# Patient Record
Sex: FEMALE | Race: ASIAN | Employment: FULL TIME | URBAN - METROPOLITAN AREA
[De-identification: names, ages, dates, MRNs, and addresses within clinical notes are randomized per-mention and may not be internally consistent; named-entity substitution may affect disease eponyms.]

---

## 2017-01-19 ENCOUNTER — OFFICE VISIT (OUTPATIENT)
Dept: NEPHROLOGY | Facility: CLINIC | Age: 57
End: 2017-01-19

## 2017-01-19 VITALS — WEIGHT: 155 LBS | BODY MASS INDEX: 30 KG/M2 | DIASTOLIC BLOOD PRESSURE: 88 MMHG | SYSTOLIC BLOOD PRESSURE: 164 MMHG

## 2017-01-19 DIAGNOSIS — I10 ESSENTIAL HYPERTENSION: ICD-10-CM

## 2017-01-19 DIAGNOSIS — N02.8 IGA NEPHROPATHY: ICD-10-CM

## 2017-01-19 DIAGNOSIS — N18.31 CKD STAGE G3A/A1, GFR 45-59 AND ALBUMIN CREATININE RATIO <30 MG/G (HCC): Primary | ICD-10-CM

## 2017-01-19 DIAGNOSIS — N18.4 CKD (CHRONIC KIDNEY DISEASE), STAGE 4 (SEVERE): ICD-10-CM

## 2017-01-19 NOTE — PROGRESS NOTES
Nephrology Progress Note      ASSESSMENT/PLAN:        1) CKD 4- due to biopsy-proven IgA nephropathy with progressive renal dysfunction- Cr has increased from 1.3-2.9 mg/dL since 2013.  Unfortunately, there is no direct therapy (ie steroids / Harini Prows • Hypertension    • Obesity    • IgA nephropathy      nephrotic range proteinuria   • Vitamin D deficiency    • GERD (gastroesophageal reflux disease)    • Fatty liver 1/18/2013          Past Surgical History    TOTAL ABDOM HYSTERECTOMY      COLONOSCOPY, rashes/myalgias/arthralgias      PHYSICAL EXAM:   /88 mmHg  Wt 155 lb  Wt Readings from Last 3 Encounters:  01/19/17 : 155 lb  10/07/16 : 148 lb 12.8 oz  07/19/16 : 146 lb    General: Alert and oriented in no apparent distress.   HEENT: No scleral ict

## 2017-03-21 ENCOUNTER — HOSPITAL ENCOUNTER (INPATIENT)
Facility: HOSPITAL | Age: 57
LOS: 1 days | Discharge: HOME | DRG: 683 | End: 2017-03-23
Attending: EMERGENCY MEDICINE | Admitting: INTERNAL MEDICINE
Payer: COMMERCIAL

## 2017-03-21 DIAGNOSIS — D64.9 ANEMIA, UNSPECIFIED TYPE: ICD-10-CM

## 2017-03-21 DIAGNOSIS — E16.2 HYPOGLYCEMIA: ICD-10-CM

## 2017-03-21 DIAGNOSIS — N19 RENAL FAILURE: Primary | ICD-10-CM

## 2017-03-21 LAB
ALBUMIN SERPL-MCNC: 3.1 G/DL (ref 3.5–4.8)
ALP LIVER SERPL-CCNC: 100 U/L (ref 46–118)
ALT SERPL-CCNC: 21 U/L (ref 14–54)
AST SERPL-CCNC: 12 U/L (ref 15–41)
BASOPHILS # BLD AUTO: 0.06 X10(3) UL (ref 0–0.1)
BASOPHILS NFR BLD AUTO: 0.7 %
BILIRUB SERPL-MCNC: <0.1 MG/DL (ref 0.1–2)
BUN BLD-MCNC: 88 MG/DL (ref 8–20)
CALCIUM BLD-MCNC: 7.9 MG/DL (ref 8.3–10.3)
CHLORIDE: 116 MMOL/L (ref 101–111)
CO2: 13 MMOL/L (ref 22–32)
CREAT BLD-MCNC: 5.46 MG/DL (ref 0.55–1.02)
EOSINOPHIL # BLD AUTO: 0.21 X10(3) UL (ref 0–0.3)
EOSINOPHIL NFR BLD AUTO: 2.3 %
ERYTHROCYTE [DISTWIDTH] IN BLOOD BY AUTOMATED COUNT: 13.4 % (ref 11.5–16)
GLUCOSE BLD-MCNC: 62 MG/DL (ref 70–99)
GLUCOSE BLD-MCNC: 71 MG/DL (ref 65–99)
HCT VFR BLD AUTO: 24.8 % (ref 34–50)
HGB BLD-MCNC: 8.5 G/DL (ref 12–16)
IMMATURE GRANULOCYTE COUNT: 0.04 X10(3) UL (ref 0–1)
IMMATURE GRANULOCYTE RATIO %: 0.4 %
LIPASE: 938 U/L (ref 73–393)
LYMPHOCYTES # BLD AUTO: 1.43 X10(3) UL (ref 0.9–4)
LYMPHOCYTES NFR BLD AUTO: 15.8 %
M PROTEIN MFR SERPL ELPH: 7 G/DL (ref 6.1–8.3)
MCH RBC QN AUTO: 30.5 PG (ref 27–33.2)
MCHC RBC AUTO-ENTMCNC: 34.3 G/DL (ref 31–37)
MCV RBC AUTO: 88.9 FL (ref 81–100)
MONOCYTES # BLD AUTO: 0.84 X10(3) UL (ref 0.1–0.6)
MONOCYTES NFR BLD AUTO: 9.3 %
NEUTROPHIL ABS PRELIM: 6.47 X10 (3) UL (ref 1.3–6.7)
NEUTROPHILS # BLD AUTO: 6.47 X10(3) UL (ref 1.3–6.7)
NEUTROPHILS NFR BLD AUTO: 71.5 %
PLATELET # BLD AUTO: 286 10(3)UL (ref 150–450)
POTASSIUM SERPL-SCNC: 5 MMOL/L (ref 3.6–5.1)
RBC # BLD AUTO: 2.79 X10(6)UL (ref 3.8–5.1)
RED CELL DISTRIBUTION WIDTH-SD: 43.8 FL (ref 35.1–46.3)
SODIUM SERPL-SCNC: 141 MMOL/L (ref 136–144)
WBC # BLD AUTO: 9.1 X10(3) UL (ref 4–13)

## 2017-03-21 RX ORDER — DEXTROSE AND SODIUM CHLORIDE 5; .45 G/100ML; G/100ML
INJECTION, SOLUTION INTRAVENOUS ONCE
Status: COMPLETED | OUTPATIENT
Start: 2017-03-21 | End: 2017-03-22

## 2017-03-22 ENCOUNTER — APPOINTMENT (OUTPATIENT)
Dept: CT IMAGING | Facility: HOSPITAL | Age: 57
DRG: 683 | End: 2017-03-22
Attending: INTERNAL MEDICINE
Payer: COMMERCIAL

## 2017-03-22 PROBLEM — E16.2 HYPOGLYCEMIA: Status: ACTIVE | Noted: 2017-03-22

## 2017-03-22 PROBLEM — D64.9 ANEMIA, UNSPECIFIED TYPE: Status: ACTIVE | Noted: 2017-03-22

## 2017-03-22 LAB
BASOPHILS # BLD AUTO: 0.04 X10(3) UL (ref 0–0.1)
BASOPHILS NFR BLD AUTO: 0.6 %
BILIRUB UR QL STRIP.AUTO: NEGATIVE
BUN BLD-MCNC: 82 MG/DL (ref 8–20)
CALCIUM BLD-MCNC: 7.8 MG/DL (ref 8.3–10.3)
CHLORIDE: 119 MMOL/L (ref 101–111)
CLARITY UR REFRACT.AUTO: CLEAR
CO2: 13 MMOL/L (ref 22–32)
COLOR UR AUTO: COLORLESS
CREAT BLD-MCNC: 5.24 MG/DL (ref 0.55–1.02)
DEPRECATED HBV CORE AB SER IA-ACNC: 66.7 NG/ML (ref 10–291)
EOSINOPHIL # BLD AUTO: 0.21 X10(3) UL (ref 0–0.3)
EOSINOPHIL NFR BLD AUTO: 2.9 %
ERYTHROCYTE [DISTWIDTH] IN BLOOD BY AUTOMATED COUNT: 13.3 % (ref 11.5–16)
EST. AVERAGE GLUCOSE BLD GHB EST-MCNC: 114 MG/DL (ref 68–126)
GLUCOSE BLD-MCNC: 100 MG/DL (ref 65–99)
GLUCOSE BLD-MCNC: 100 MG/DL (ref 65–99)
GLUCOSE BLD-MCNC: 117 MG/DL (ref 70–99)
GLUCOSE BLD-MCNC: 131 MG/DL (ref 65–99)
GLUCOSE BLD-MCNC: 154 MG/DL (ref 65–99)
GLUCOSE BLD-MCNC: 156 MG/DL (ref 65–99)
GLUCOSE BLD-MCNC: 171 MG/DL (ref 65–99)
GLUCOSE BLD-MCNC: 196 MG/DL (ref 65–99)
GLUCOSE BLD-MCNC: 44 MG/DL (ref 65–99)
GLUCOSE BLD-MCNC: 63 MG/DL (ref 65–99)
GLUCOSE BLD-MCNC: 68 MG/DL (ref 65–99)
GLUCOSE BLD-MCNC: 82 MG/DL (ref 65–99)
GLUCOSE UR STRIP.AUTO-MCNC: NEGATIVE MG/DL
HBA1C MFR BLD HPLC: 5.6 % (ref ?–5.7)
HCT VFR BLD AUTO: 22.9 % (ref 34–50)
HGB BLD-MCNC: 7.6 G/DL (ref 12–16)
IMMATURE GRANULOCYTE COUNT: 0.04 X10(3) UL (ref 0–1)
IMMATURE GRANULOCYTE RATIO %: 0.6 %
IRON SATURATION: 40 % (ref 13–45)
IRON: 98 UG/DL (ref 28–170)
KETONES UR STRIP.AUTO-MCNC: NEGATIVE MG/DL
LEUKOCYTE ESTERASE UR QL STRIP.AUTO: NEGATIVE
LYMPHOCYTES # BLD AUTO: 1.47 X10(3) UL (ref 0.9–4)
LYMPHOCYTES NFR BLD AUTO: 20.2 %
MCH RBC QN AUTO: 29.5 PG (ref 27–33.2)
MCHC RBC AUTO-ENTMCNC: 33.2 G/DL (ref 31–37)
MCV RBC AUTO: 88.8 FL (ref 81–100)
MONOCYTES # BLD AUTO: 0.62 X10(3) UL (ref 0.1–0.6)
MONOCYTES NFR BLD AUTO: 8.5 %
NEUTROPHIL ABS PRELIM: 4.88 X10 (3) UL (ref 1.3–6.7)
NEUTROPHILS # BLD AUTO: 4.88 X10(3) UL (ref 1.3–6.7)
NEUTROPHILS NFR BLD AUTO: 67.2 %
NITRITE UR QL STRIP.AUTO: NEGATIVE
PH UR STRIP.AUTO: 5 [PH] (ref 4.5–8)
PLATELET # BLD AUTO: 255 10(3)UL (ref 150–450)
POTASSIUM SERPL-SCNC: 4.9 MMOL/L (ref 3.6–5.1)
PROT UR STRIP.AUTO-MCNC: 100 MG/DL
PTH-INTACT SERPL-MCNC: 503.9 PG/ML (ref 11.1–79.5)
RBC # BLD AUTO: 2.58 X10(6)UL (ref 3.8–5.1)
RBC UR QL AUTO: NEGATIVE
RED CELL DISTRIBUTION WIDTH-SD: 43.8 FL (ref 35.1–46.3)
SODIUM SERPL-SCNC: 144 MMOL/L (ref 136–144)
SP GR UR STRIP.AUTO: 1.01 (ref 1–1.03)
TOTAL IRON BINDING CAPACITY: 247 UG/DL (ref 298–536)
TRANSFERRIN: 166 MG/DL (ref 200–360)
UROBILINOGEN UR STRIP.AUTO-MCNC: <2 MG/DL
WBC # BLD AUTO: 7.3 X10(3) UL (ref 4–13)

## 2017-03-22 PROCEDURE — 99254 IP/OBS CNSLTJ NEW/EST MOD 60: CPT | Performed by: INTERNAL MEDICINE

## 2017-03-22 PROCEDURE — 74176 CT ABD & PELVIS W/O CONTRAST: CPT

## 2017-03-22 RX ORDER — DEXTROSE MONOHYDRATE 25 G/50ML
50 INJECTION, SOLUTION INTRAVENOUS
Status: DISCONTINUED | OUTPATIENT
Start: 2017-03-22 | End: 2017-03-23

## 2017-03-22 RX ORDER — SODIUM BICARBONATE 325 MG/1
650 TABLET ORAL 2 TIMES DAILY
Status: DISCONTINUED | OUTPATIENT
Start: 2017-03-22 | End: 2017-03-23

## 2017-03-22 RX ORDER — DEXTROSE MONOHYDRATE 50 MG/ML
INJECTION, SOLUTION INTRAVENOUS CONTINUOUS
Status: DISCONTINUED | OUTPATIENT
Start: 2017-03-22 | End: 2017-03-22

## 2017-03-22 RX ORDER — ATORVASTATIN CALCIUM 40 MG/1
40 TABLET, FILM COATED ORAL NIGHTLY
Status: DISCONTINUED | OUTPATIENT
Start: 2017-03-22 | End: 2017-03-23

## 2017-03-22 RX ORDER — HYDRALAZINE HYDROCHLORIDE 25 MG/1
25 TABLET, FILM COATED ORAL 2 TIMES DAILY
Status: DISCONTINUED | OUTPATIENT
Start: 2017-03-22 | End: 2017-03-23

## 2017-03-22 RX ORDER — ONDANSETRON 2 MG/ML
4 INJECTION INTRAMUSCULAR; INTRAVENOUS EVERY 6 HOURS PRN
Status: DISCONTINUED | OUTPATIENT
Start: 2017-03-22 | End: 2017-03-23

## 2017-03-22 RX ORDER — LISINOPRIL 20 MG/1
20 TABLET ORAL DAILY
Status: DISCONTINUED | OUTPATIENT
Start: 2017-03-22 | End: 2017-03-22

## 2017-03-22 RX ORDER — COLESEVELAM 180 1/1
1875 TABLET ORAL 2 TIMES DAILY WITH MEALS
Status: DISCONTINUED | OUTPATIENT
Start: 2017-03-22 | End: 2017-03-23

## 2017-03-22 RX ORDER — DILTIAZEM HYDROCHLORIDE 180 MG/1
360 CAPSULE, COATED, EXTENDED RELEASE ORAL
Status: DISCONTINUED | OUTPATIENT
Start: 2017-03-22 | End: 2017-03-22

## 2017-03-22 RX ORDER — HEPARIN SODIUM 5000 [USP'U]/ML
5000 INJECTION, SOLUTION INTRAVENOUS; SUBCUTANEOUS EVERY 8 HOURS
Status: DISCONTINUED | OUTPATIENT
Start: 2017-03-22 | End: 2017-03-23

## 2017-03-22 RX ORDER — DEXTROSE MONOHYDRATE 25 G/50ML
INJECTION, SOLUTION INTRAVENOUS
Status: COMPLETED
Start: 2017-03-22 | End: 2017-03-22

## 2017-03-22 RX ORDER — ZOLPIDEM TARTRATE 5 MG/1
5 TABLET ORAL ONCE
Status: COMPLETED | OUTPATIENT
Start: 2017-03-22 | End: 2017-03-22

## 2017-03-22 RX ORDER — SODIUM CHLORIDE 9 MG/ML
INJECTION, SOLUTION INTRAVENOUS CONTINUOUS
Status: DISCONTINUED | OUTPATIENT
Start: 2017-03-22 | End: 2017-03-22

## 2017-03-22 RX ORDER — AMLODIPINE BESYLATE 5 MG/1
5 TABLET ORAL DAILY
Status: DISCONTINUED | OUTPATIENT
Start: 2017-03-22 | End: 2017-03-23

## 2017-03-22 RX ORDER — KETOTIFEN FUMARATE 0.35 MG/ML
1 SOLUTION/ DROPS OPHTHALMIC 2 TIMES DAILY
Status: DISCONTINUED | OUTPATIENT
Start: 2017-03-22 | End: 2017-03-23

## 2017-03-22 RX ORDER — ACETAMINOPHEN 325 MG/1
650 TABLET ORAL EVERY 6 HOURS PRN
Status: DISCONTINUED | OUTPATIENT
Start: 2017-03-22 | End: 2017-03-23

## 2017-03-22 NOTE — ED PROVIDER NOTES
Patient Seen in: [facility] Emergency Department    History   Patient presents with:  Hypoglycemia (metabolic)    Stated Complaint: Hypoglycemia    HPI  Patient is a 80-year-old female who since this morning his felt lightheaded and weak.   Patient's Oral Cap,  TAKE 1 CAPSULE ONCE A WEEK   Omega-3-acid Ethyl Esters 1 g Oral Cap,  Take 3 capsules by mouth  daily   Olopatadine HCl 0.2 % Ophthalmic Solution,  Apply 1 drop to eye daily.    Glucose Blood (ONETOUCH ULTRA BLUE) In Vitro Strip,  USE TO TEST BLO Notable for the following:     Lipase 938 (*)     All other components within normal limits   COMP METABOLIC PANEL (14) - Abnormal; Notable for the following:     Glucose 62 (*)     BUN 88 (*)     Creatinine 5.46 (*)     GFR 8 (*)     Calcium, Total 7.9 Adan Davisbons Medication List        Present on Admission  Date Reviewed: 2/13/2017          ICD-10-CM Noted POA    Renal failure N19 3/21/2017 Unknown

## 2017-03-22 NOTE — ED INITIAL ASSESSMENT (HPI)
Pt c/o hypoglycemia states that blood sugar has been in the 40s for the past 12 hours. Pt states she has had a lot of juice and food without increase in blood sugar. Accuchek on arrival is 70. Pt c/o lightheadedness.

## 2017-03-22 NOTE — CONSULTS
[facility]  Report of Consultation    Ariadna Tolentino Patient Status:  Inpatient    3/13/1960 MRN [id number]   [facility] 4NW-A Attending Lesley Madrid, [address] Day # 1 PCP Kamille Abernathy MD       Assessment / Plan:    1) CKD 4- baseli Clary Melgar at [address] [facility]     Family History   Problem Relation Age of Onset   • Hypertension Mother    • Diabetes Mother    • Cancer Father    • Diabetes Brother    • Diabetes Other       reports that she has never smoked.  She has never u (36.4 °C)       Intake/Output Summary (Last 24 hours) at 03/22/17 0933  Last data filed at 03/22/17 0525   Gross per 24 hour   Intake    695 ml   Output      0 ml   Net    695 ml     Wt Readings from Last 3 Encounters:  03/21/17 : 147 lb  02/13/17 : 151 lb

## 2017-03-22 NOTE — PAYOR COMM NOTE
Attending Physician: Kendall Araiza DO    Review Type: ADMISSION   Reviewer: Evelyn Albarran       Date: March 22, 2017 - 10:03 AM  Payor: KERRI SALGADO  Authorization Number: N/A  Admit date: 3/21/2017 10:21 PM   Admitted from Emergency Dept.: yes    Zuly Ramirez 3/22/2017 8157 Given 360 mg Oral Harriet Viet RN      Heparin Sodium (Porcine) 5000 UNIT/ML injection 5,000 Units     Date Action Dose Route User    3/22/2017 0519 Given 5000 Units Subcutaneous (Left Upper Abdomen) Karri Lowe RN      hydrALAzin for the following:     POC Glucose 44 (*)     All other components within normal limits   POCT GLUCOSE - Abnormal; Notable for the following:     POC Glucose 171 (*)     All other components within normal limits   POCT GLUCOSE - Abnormal; Notable for the f individual orders. FERRITIN   IRON AND TIBC   PTH, INTACT   RAINBOW DRAW BLUE   RAINBOW DRAW GOLD   RAINBOW DRAW LAVENDER   RAINBOW DRAW LIGHT GREEN   OCCULT BLOOD, STOOL         .     Diagnosis:  CKD 4- baseline Cr approx 3 mg/dl due to biopsy proven IgA

## 2017-03-22 NOTE — PROGRESS NOTES
NURSING ADMISSION NOTE      Patient admitted via Cart from ER. Oriented to room. Safety precautions initiated. Bed in low position. Call light in reach. Blood sugar 82 on arrival to the floor. Hs snacks given. Patient is alert and  Oriented.  Up t

## 2017-03-22 NOTE — PLAN OF CARE
Diabetes/Glucose Control    • Glucose maintained within prescribed range Progressing          0200 - Assumed care. Pt resting comfortably. Call light in reach. Bed in lowest position. Daughter at the bedside.     9510 - Pt diaphoretic, states \"I don't feel

## 2017-03-22 NOTE — H&P
[facility] Hospitalist History and Physical      Patient presents with:  Hypoglycemia (metabolic)       PCP: Bala White MD      History of Present Illness: Patient is a 62year old female with PMH sig for DM2, HL, HTN, CKD, GERD, and IGA nephropathy who present urgency. MUSCULOSKELETAL:  No arthritis  SKIN:  No change in skin, hair or nails. NEUROLOGIC:  No paresthesias, weakness, or numbness. PSYCHIATRIC:  No disorder of thought or mood. ENDOCRINE:  No heat or cold intolerance, polyuria or polydipsia.   HEMAT Unenhanced multislice CT scanning was performed from the dome of the diaphragm to the pubic symphysis. Dose reduction techniques were used.  Dose information is transmitted to the [facility] ([email or web address] of Radiology) [place]. [place] nephropathy who presents with feeling weak and dizzy    #BEN on CKD 4   -due to biopsy proven igA nephropathy  -unclear etiology but has had decreased po intake per niece  -holding ace, cont IVF  -renal on consult  -undergoing transplant eval    #Metabolic

## 2017-03-23 VITALS
BODY MASS INDEX: 27.75 KG/M2 | HEIGHT: 61 IN | TEMPERATURE: 98 F | SYSTOLIC BLOOD PRESSURE: 154 MMHG | RESPIRATION RATE: 20 BRPM | DIASTOLIC BLOOD PRESSURE: 67 MMHG | WEIGHT: 147 LBS | HEART RATE: 65 BPM | OXYGEN SATURATION: 97 %

## 2017-03-23 LAB
BASOPHILS # BLD AUTO: 0.06 X10(3) UL (ref 0–0.1)
BASOPHILS NFR BLD AUTO: 0.7 %
BUN BLD-MCNC: 76 MG/DL (ref 8–20)
CALCIUM BLD-MCNC: 7.9 MG/DL (ref 8.3–10.3)
CHLORIDE: 112 MMOL/L (ref 101–111)
CO2: 24 MMOL/L (ref 22–32)
CREAT BLD-MCNC: 4.78 MG/DL (ref 0.55–1.02)
EOSINOPHIL # BLD AUTO: 0.38 X10(3) UL (ref 0–0.3)
EOSINOPHIL NFR BLD AUTO: 4.4 %
ERYTHROCYTE [DISTWIDTH] IN BLOOD BY AUTOMATED COUNT: 13.1 % (ref 11.5–16)
GLUCOSE BLD-MCNC: 122 MG/DL (ref 70–99)
GLUCOSE BLD-MCNC: 128 MG/DL (ref 65–99)
GLUCOSE BLD-MCNC: 160 MG/DL (ref 65–99)
HAV IGM SER QL: 1.7 MG/DL (ref 1.7–3)
HCT VFR BLD AUTO: 22.2 % (ref 34–50)
HGB BLD-MCNC: 7.7 G/DL (ref 12–16)
IMMATURE GRANULOCYTE COUNT: 0.02 X10(3) UL (ref 0–1)
IMMATURE GRANULOCYTE RATIO %: 0.2 %
LYMPHOCYTES # BLD AUTO: 2.84 X10(3) UL (ref 0.9–4)
LYMPHOCYTES NFR BLD AUTO: 32.9 %
MCH RBC QN AUTO: 30.4 PG (ref 27–33.2)
MCHC RBC AUTO-ENTMCNC: 34.7 G/DL (ref 31–37)
MCV RBC AUTO: 87.7 FL (ref 81–100)
MONOCYTES # BLD AUTO: 1 X10(3) UL (ref 0.1–0.6)
MONOCYTES NFR BLD AUTO: 11.6 %
NEUTROPHIL ABS PRELIM: 4.34 X10 (3) UL (ref 1.3–6.7)
NEUTROPHILS # BLD AUTO: 4.34 X10(3) UL (ref 1.3–6.7)
NEUTROPHILS NFR BLD AUTO: 50.2 %
PHOSPHATE SERPL-MCNC: 3.9 MG/DL (ref 2.5–4.9)
PLATELET # BLD AUTO: 265 10(3)UL (ref 150–450)
POTASSIUM SERPL-SCNC: 4.5 MMOL/L (ref 3.6–5.1)
RBC # BLD AUTO: 2.53 X10(6)UL (ref 3.8–5.1)
RED CELL DISTRIBUTION WIDTH-SD: 41.2 FL (ref 35.1–46.3)
SODIUM SERPL-SCNC: 145 MMOL/L (ref 136–144)
WBC # BLD AUTO: 8.6 X10(3) UL (ref 4–13)

## 2017-03-23 RX ORDER — AMLODIPINE BESYLATE 5 MG/1
5 TABLET ORAL DAILY
Qty: 30 TABLET | Refills: 0 | Status: SHIPPED | OUTPATIENT
Start: 2017-03-23 | End: 2017-04-27

## 2017-03-23 RX ORDER — SODIUM BICARBONATE 650 MG/1
650 TABLET ORAL 2 TIMES DAILY
Qty: 60 TABLET | Refills: 0 | Status: SHIPPED | OUTPATIENT
Start: 2017-03-23 | End: 2017-06-22

## 2017-03-23 NOTE — PLAN OF CARE
Diabetes/Glucose Control    • Glucose maintained within prescribed range Progressing        METABOLIC/FLUID AND ELECTROLYTES - ADULT    • Electrolytes maintained within normal limits Progressing    • Hemodynamic stability and optimal renal function yolisi

## 2017-03-23 NOTE — PROGRESS NOTES
[facility]  Nephrology Progress Note    [address] [place] Attending:  Elie Soliz,        Assessment and Plan:    1) BEN- due to volume depletion + ACE-i effect- improving with IVF; expect Cr to plateau < 4 mg/dl.  Undergoing transplant eval    2) CKD 4 03/23/2017    03/23/2017   CA 7.9 03/23/2017   MG 1.7 03/23/2017   PHOS 3.9 03/23/2017   PGLU 128 03/23/2017       Imaging: All imaging studies reviewed.     Meds:     Current Facility-Administered Medications:  Heparin Sodium (Porcine) 5000 UNIT/ML

## 2017-03-23 NOTE — DISCHARGE SUMMARY
General Medicine Discharge Summary     Patient ID:  Beecher Severs  62year old  3/13/1960    Admit date: 3/21/2017    Discharge date and time: 3/23/17    Attending Physician: DO Dick Putnam lisinopril and cardizem. Cont hydralazine.   Amlodipine started    #Adrenal incidentaloma  -noted on CT scan - f/u with PCP to w/u    #Anemia  -likely of chronic disease  -iron studies not c/w iron deficiency    I discussed this with patient's niece who is thickening, lesion, or calculus. PELVIC NODES:  Normal.  No adenopathy. PELVIC ORGANS:  Status post hysterectomy. BONES:  Mild degenerative changes of the lower thoracic spine. LUNG BASES:  Probable subpleural atelectasis in the left lower lobe.         3

## 2017-03-23 NOTE — PROGRESS NOTES
NURSING DISCHARGE NOTE    Discharged Home via Ambulatory. Accompanied by Spouse  Belongings Taken by patient/family. Pt dc'd aaox4, denies pain, epogen given, dc instructions given to pt, verbalized understanding.

## 2017-03-23 NOTE — PLAN OF CARE
Diabetes/Glucose Control    • Glucose maintained within prescribed range Progressing        Pt's blood sugars in the 100s to 190s today. No further episodes of hypogolycemia. AIC noted to be 5.6.      HEMATOLOGIC - ADULT    • Maintains hematologic stability

## 2017-03-30 ENCOUNTER — APPOINTMENT (OUTPATIENT)
Dept: LAB | Age: 57
End: 2017-03-30
Attending: HOSPITALIST
Payer: COMMERCIAL

## 2017-03-30 DIAGNOSIS — N19 RENAL FAILURE: ICD-10-CM

## 2017-03-30 LAB
BUN BLD-MCNC: 98 MG/DL (ref 8–20)
CALCIUM BLD-MCNC: 9 MG/DL (ref 8.3–10.3)
CHLORIDE: 107 MMOL/L (ref 101–111)
CO2: 19 MMOL/L (ref 22–32)
CREAT BLD-MCNC: 4.89 MG/DL (ref 0.55–1.02)
GLUCOSE BLD-MCNC: 101 MG/DL (ref 70–99)
POTASSIUM SERPL-SCNC: 4.5 MMOL/L (ref 3.6–5.1)
SODIUM SERPL-SCNC: 137 MMOL/L (ref 136–144)

## 2017-03-30 PROCEDURE — 80048 BASIC METABOLIC PNL TOTAL CA: CPT

## 2017-04-17 ENCOUNTER — LAB ENCOUNTER (OUTPATIENT)
Dept: LAB | Age: 57
End: 2017-04-17
Attending: INTERNAL MEDICINE
Payer: COMMERCIAL

## 2017-04-17 ENCOUNTER — OFFICE VISIT (OUTPATIENT)
Dept: NEPHROLOGY | Facility: CLINIC | Age: 57
End: 2017-04-17

## 2017-04-17 VITALS
SYSTOLIC BLOOD PRESSURE: 156 MMHG | DIASTOLIC BLOOD PRESSURE: 86 MMHG | RESPIRATION RATE: 18 BRPM | WEIGHT: 144 LBS | BODY MASS INDEX: 27 KG/M2 | HEART RATE: 74 BPM

## 2017-04-17 DIAGNOSIS — N18.4 CKD (CHRONIC KIDNEY DISEASE), STAGE 4 (SEVERE): Primary | ICD-10-CM

## 2017-04-17 DIAGNOSIS — N18.4 CKD (CHRONIC KIDNEY DISEASE), STAGE 4 (SEVERE): ICD-10-CM

## 2017-04-17 DIAGNOSIS — N18.9 ANEMIA IN CHRONIC KIDNEY DISEASE(285.21): ICD-10-CM

## 2017-04-17 DIAGNOSIS — D63.1 ANEMIA IN CHRONIC KIDNEY DISEASE(285.21): ICD-10-CM

## 2017-04-17 DIAGNOSIS — E87.2 METABOLIC ACIDOSIS: ICD-10-CM

## 2017-04-17 DIAGNOSIS — N17.9 AKI (ACUTE KIDNEY INJURY) (HCC): ICD-10-CM

## 2017-04-17 PROCEDURE — 80048 BASIC METABOLIC PNL TOTAL CA: CPT | Performed by: INTERNAL MEDICINE

## 2017-04-17 PROCEDURE — 82728 ASSAY OF FERRITIN: CPT | Performed by: INTERNAL MEDICINE

## 2017-04-17 PROCEDURE — 83540 ASSAY OF IRON: CPT | Performed by: INTERNAL MEDICINE

## 2017-04-17 PROCEDURE — 99213 OFFICE O/P EST LOW 20 MIN: CPT | Performed by: INTERNAL MEDICINE

## 2017-04-17 PROCEDURE — 83550 IRON BINDING TEST: CPT | Performed by: INTERNAL MEDICINE

## 2017-04-17 PROCEDURE — 36415 COLL VENOUS BLD VENIPUNCTURE: CPT | Performed by: INTERNAL MEDICINE

## 2017-04-17 PROCEDURE — 85025 COMPLETE CBC W/AUTO DIFF WBC: CPT | Performed by: INTERNAL MEDICINE

## 2017-04-17 NOTE — PROGRESS NOTES
Nephrology Progress Note      ASSESSMENT/PLAN:        1) CKD 4- due to biopsy-proven IgA nephropathy with progressive renal dysfunction- Cr has increased from 1.3-> 3+ mg/dL since 2013.  Unfortunately, there is no direct therapy (ie steroids / immunosuppres 6/1/10    Comment Performed by Sanjeev Michaud at [facility]      Family History   Problem Relation Age of Onset   • Hypertension Mother    • Diabetes Mother    • Cancer Father    • Diabetes Brother    • Diabetes Other       Social History: thyromegaly  Cardiac: Regular rate and rhythm, S1, S2 normal, no murmur or rub  Lungs: Clear without wheezes, rales, rhonchi. Abdomen: Soft, non-tender. + bowel sounds, no palpable organomegaly  Extremities: Without clubbing, cyanosis or edema.   Neurolo

## 2017-05-05 ENCOUNTER — TELEPHONE (OUTPATIENT)
Dept: NEPHROLOGY | Facility: CLINIC | Age: 57
End: 2017-05-05

## 2017-05-05 DIAGNOSIS — N18.4 CKD (CHRONIC KIDNEY DISEASE), STAGE 4 (SEVERE): Primary | ICD-10-CM

## 2017-05-05 DIAGNOSIS — N18.9 ANEMIA IN CHRONIC KIDNEY DISEASE(285.21): ICD-10-CM

## 2017-05-05 DIAGNOSIS — D63.1 ANEMIA IN CHRONIC KIDNEY DISEASE(285.21): ICD-10-CM

## 2017-05-18 ENCOUNTER — TELEPHONE (OUTPATIENT)
Dept: NEPHROLOGY | Facility: CLINIC | Age: 57
End: 2017-05-18

## 2017-05-22 NOTE — TELEPHONE ENCOUNTER
Left VM with pt- labs relatively stable; no indication for dialysis; to continue checking labs monthly and has f/u appt in August- thx nadira

## 2017-06-22 RX ORDER — AMLODIPINE BESYLATE 5 MG/1
5 TABLET ORAL DAILY
Qty: 30 TABLET | Refills: 5 | Status: SHIPPED | OUTPATIENT
Start: 2017-06-22 | End: 2017-10-26 | Stop reason: DRUGHIGH

## 2017-06-22 RX ORDER — SODIUM BICARBONATE 650 MG/1
650 TABLET ORAL 2 TIMES DAILY
Qty: 60 TABLET | Refills: 5 | Status: SHIPPED | OUTPATIENT
Start: 2017-06-22 | End: 2018-02-08 | Stop reason: ALTCHOICE

## 2017-06-22 RX ORDER — HYDRALAZINE HYDROCHLORIDE 25 MG/1
25 TABLET, FILM COATED ORAL 2 TIMES DAILY
Qty: 60 TABLET | Refills: 5 | Status: SHIPPED | OUTPATIENT
Start: 2017-06-22 | End: 2017-10-26

## 2017-07-16 LAB
ABSOLUTE BASOPHILS: 30 CELLS/UL (ref 0–200)
ABSOLUTE EOSINOPHILS: 354 CELLS/UL (ref 15–500)
ABSOLUTE LYMPHOCYTES: 2677 CELLS/UL (ref 850–3900)
ABSOLUTE MONOCYTES: 556 CELLS/UL (ref 200–950)
ABSOLUTE NEUTROPHILS: 6484 CELLS/UL (ref 1500–7800)
BASOPHILS: 0.3 %
BUN/CREATININE RATIO: 13 (CALC) (ref 6–22)
BUN: 110 MG/DL (ref 7–25)
CALCIUM: 8 MG/DL (ref 8.6–10.4)
CARBON DIOXIDE: 15 MMOL/L (ref 20–31)
CHLORIDE: 111 MMOL/L (ref 98–110)
CREATININE: 8.77 MG/DL (ref 0.5–1.05)
EGFR IF AFRICN AM: 5 ML/MIN/1.73M2
EGFR IF NONAFRICN AM: 5 ML/MIN/1.73M2
EOSINOPHILS: 3.5 %
GLUCOSE: 79 MG/DL (ref 65–99)
HEMATOCRIT: 24.5 % (ref 35–45)
HEMOGLOBIN: 7.9 G/DL (ref 11.7–15.5)
LYMPHOCYTES: 26.5 %
MCH: 28.6 PG (ref 27–33)
MCHC: 32.2 G/DL (ref 32–36)
MCV: 88.8 FL (ref 80–100)
MONOCYTES: 5.5 %
MPV: 9.2 FL (ref 7.5–12.5)
NEUTROPHILS: 64.2 %
PLATELET COUNT: 243 THOUSAND/UL (ref 140–400)
POTASSIUM: 4.4 MMOL/L (ref 3.5–5.3)
RDW: 13.6 % (ref 11–15)
RED BLOOD CELL COUNT: 2.76 MILLION/UL (ref 3.8–5.1)
SODIUM: 142 MMOL/L (ref 135–146)
WHITE BLOOD CELL COUNT: 10.1 THOUSAND/UL (ref 3.8–10.8)

## 2017-07-19 ENCOUNTER — TELEPHONE (OUTPATIENT)
Dept: NEPHROLOGY | Facility: CLINIC | Age: 57
End: 2017-07-19

## 2017-07-20 ENCOUNTER — TELEPHONE (OUTPATIENT)
Dept: NEPHROLOGY | Facility: CLINIC | Age: 57
End: 2017-07-20

## 2017-07-20 DIAGNOSIS — Z99.2 ESRD NEEDING DIALYSIS (HCC): Primary | ICD-10-CM

## 2017-07-20 DIAGNOSIS — N18.6 ESRD NEEDING DIALYSIS (HCC): Primary | ICD-10-CM

## 2017-07-21 LAB
ABSOLUTE BASOPHILS: 55 CELLS/UL (ref 0–200)
ABSOLUTE EOSINOPHILS: 286 CELLS/UL (ref 15–500)
ABSOLUTE LYMPHOCYTES: 2794 CELLS/UL (ref 850–3900)
ABSOLUTE MONOCYTES: 506 CELLS/UL (ref 200–950)
ABSOLUTE NEUTROPHILS: 7359 CELLS/UL (ref 1500–7800)
BASOPHILS: 0.5 %
BUN/CREATININE RATIO: 14 (CALC) (ref 6–22)
BUN: 110 MG/DL (ref 7–25)
CALCIUM: 8.3 MG/DL (ref 8.6–10.4)
CARBON DIOXIDE: 17 MMOL/L (ref 20–31)
CHLORIDE: 109 MMOL/L (ref 98–110)
CREATININE: 7.8 MG/DL (ref 0.5–1.05)
EGFR IF AFRICN AM: 6 ML/MIN/1.73M2
EGFR IF NONAFRICN AM: 5 ML/MIN/1.73M2
EOSINOPHILS: 2.6 %
GLUCOSE: 95 MG/DL (ref 65–99)
HEMATOCRIT: 25.5 % (ref 35–45)
HEMOGLOBIN: 8.5 G/DL (ref 11.7–15.5)
LYMPHOCYTES: 25.4 %
MCH: 28.9 PG (ref 27–33)
MCHC: 33.3 G/DL (ref 32–36)
MCV: 86.7 FL (ref 80–100)
MONOCYTES: 4.6 %
MPV: 9.8 FL (ref 7.5–12.5)
NEUTROPHILS: 66.9 %
PLATELET COUNT: 275 THOUSAND/UL (ref 140–400)
POTASSIUM: 4.3 MMOL/L (ref 3.5–5.3)
RDW: 14 % (ref 11–15)
RED BLOOD CELL COUNT: 2.94 MILLION/UL (ref 3.8–5.1)
SODIUM: 139 MMOL/L (ref 135–146)
WHITE BLOOD CELL COUNT: 11 THOUSAND/UL (ref 3.8–10.8)

## 2017-07-22 ENCOUNTER — TELEPHONE (OUTPATIENT)
Dept: NEPHROLOGY | Facility: CLINIC | Age: 57
End: 2017-07-22

## 2017-07-22 NOTE — TELEPHONE ENCOUNTER
Spoke to pt + family- to have permcath placed and start outpt HD today- ongoing eval for transplant- thx nadira

## 2017-08-03 ENCOUNTER — TELEPHONE (OUTPATIENT)
Dept: NEPHROLOGY | Facility: CLINIC | Age: 57
End: 2017-08-03

## 2017-08-04 NOTE — TELEPHONE ENCOUNTER
Pt is cleared to have AV fistula placement from an internal medicine perspective. I have examined her and reviewed her labs / EKG, etc- there is no contraindication for surgery and pt is at low risk.     Jackie Mo MD

## 2017-10-26 ENCOUNTER — OFFICE VISIT (OUTPATIENT)
Dept: NEPHROLOGY | Facility: CLINIC | Age: 57
End: 2017-10-26

## 2017-10-26 VITALS — BODY MASS INDEX: 26 KG/M2 | WEIGHT: 139 LBS | DIASTOLIC BLOOD PRESSURE: 86 MMHG | SYSTOLIC BLOOD PRESSURE: 148 MMHG

## 2017-10-26 DIAGNOSIS — N18.6 ESRD (END STAGE RENAL DISEASE) (HCC): Primary | ICD-10-CM

## 2017-10-26 RX ORDER — COLESEVELAM 180 1/1
2500 TABLET ORAL DAILY
Qty: 360 TABLET | Refills: 1 | Status: SHIPPED | OUTPATIENT
Start: 2017-10-26 | End: 2018-07-26

## 2017-10-26 RX ORDER — PYRIDOXINE HCL (VITAMIN B6) 50 MG
50 TABLET ORAL DAILY
COMMUNITY
End: 2018-02-08 | Stop reason: ALTCHOICE

## 2017-10-26 RX ORDER — ASCORBIC ACID, THIAMINE, RIBOFLAVIN, NIACINAMIDE, PYRIDOXINE, FOLIC ACID, COBALAMIN, BIOTIN, PANTOTHENIC ACID 100; 1.5; 1.7; 20; 10; 1; 6; 300; 1 MG/1; MG/1; MG/1; MG/1; MG/1; MG/1; UG/1; UG/1; MG/1
1 TABLET, COATED ORAL DAILY
COMMUNITY
End: 2018-02-08 | Stop reason: ALTCHOICE

## 2017-10-26 RX ORDER — ISONIAZID 300 MG/1
300 TABLET ORAL DAILY
COMMUNITY
End: 2018-02-08 | Stop reason: ALTCHOICE

## 2017-10-26 RX ORDER — HYDRALAZINE HYDROCHLORIDE 25 MG/1
25 TABLET, FILM COATED ORAL 2 TIMES DAILY
Qty: 180 TABLET | Refills: 1 | Status: SHIPPED | OUTPATIENT
Start: 2017-10-26 | End: 2018-07-26

## 2017-10-26 RX ORDER — ATORVASTATIN CALCIUM 40 MG/1
TABLET, FILM COATED ORAL
Qty: 90 TABLET | Refills: 1 | Status: SHIPPED | OUTPATIENT
Start: 2017-10-26 | End: 2018-04-11

## 2017-10-26 RX ORDER — AMLODIPINE BESYLATE 5 MG/1
5 TABLET ORAL 2 TIMES DAILY
COMMUNITY
End: 2017-10-26

## 2017-10-26 RX ORDER — AMLODIPINE BESYLATE 5 MG/1
5 TABLET ORAL 2 TIMES DAILY
Qty: 180 TABLET | Refills: 1 | Status: SHIPPED | OUTPATIENT
Start: 2017-10-26 | End: 2018-04-05

## 2017-10-26 NOTE — PROGRESS NOTES
Nephrology Progress Note      ASSESSMENT/PLAN:        1) ESRD- doing well on hemodialysis and meeting targets for dialysis adequacy, access, nutrition phosphorus and anemia management.   AV fistula is functioning well and hemodialysis catheter should be rem Smokeless tobacco: Never Used                      Alcohol use: No                 Medications (Active prior to today's visit):    Current Outpatient Prescriptions: AmLODIPine Besylate 5 MG Oral Tab Take 5 mg by mouth 2 (two) times daily.  Dis normal, no murmur or rub  Lungs: Clear without wheezes, rales, rhonchi. Abdomen: Soft, non-tender. + bowel sounds, no palpable organomegaly  Extremities: Without clubbing, cyanosis or edema.   Neurologic: Alert and oriented, normal affect, cranial nerves

## 2018-02-08 ENCOUNTER — OFFICE VISIT (OUTPATIENT)
Dept: NEPHROLOGY | Facility: CLINIC | Age: 58
End: 2018-02-08

## 2018-02-08 VITALS — SYSTOLIC BLOOD PRESSURE: 162 MMHG | DIASTOLIC BLOOD PRESSURE: 88 MMHG | BODY MASS INDEX: 26 KG/M2 | WEIGHT: 140 LBS

## 2018-02-08 DIAGNOSIS — I10 ESSENTIAL HYPERTENSION: ICD-10-CM

## 2018-02-08 DIAGNOSIS — R00.2 PALPITATIONS: ICD-10-CM

## 2018-02-08 DIAGNOSIS — N18.6 ESRD (END STAGE RENAL DISEASE) (HCC): Primary | ICD-10-CM

## 2018-02-08 DIAGNOSIS — R25.2 CRAMPS, EXTREMITY: ICD-10-CM

## 2018-02-08 PROCEDURE — 99214 OFFICE O/P EST MOD 30 MIN: CPT | Performed by: INTERNAL MEDICINE

## 2018-02-08 RX ORDER — CINACALCET 30 MG/1
30 TABLET, FILM COATED ORAL
COMMUNITY
End: 2018-07-25 | Stop reason: ALTCHOICE

## 2018-02-08 NOTE — PROGRESS NOTES
Nephrology Progress Note      ASSESSMENT/PLAN:        1) ESRD- doing well on hemodialysis and meeting targets for dialysis adequacy, access, nutrition phosphorus and anemia management.   AV fistula is functioning well and hemodialysis catheter should be rem Father    • Diabetes Brother    • Diabetes Other       Social History: Smoking status: Never Smoker                                                              Smokeless tobacco: Never Used                      Alcohol use:  No                 Medications alan Perez MD  2/8/2018  347 PM

## 2018-04-05 RX ORDER — AMLODIPINE BESYLATE 5 MG/1
5 TABLET ORAL 2 TIMES DAILY
Qty: 180 TABLET | Refills: 0 | Status: SHIPPED | OUTPATIENT
Start: 2018-04-05 | End: 2018-07-02

## 2018-06-25 ENCOUNTER — TELEPHONE (OUTPATIENT)
Dept: NEPHROLOGY | Facility: CLINIC | Age: 58
End: 2018-06-25

## 2018-06-25 RX ORDER — AMLODIPINE BESYLATE 10 MG/1
10 TABLET ORAL DAILY
Qty: 90 TABLET | Refills: 3 | Status: SHIPPED | OUTPATIENT
Start: 2018-06-25 | End: 2018-07-25 | Stop reason: ALTCHOICE

## 2018-07-02 RX ORDER — AMLODIPINE BESYLATE 5 MG/1
5 TABLET ORAL 2 TIMES DAILY
Qty: 180 TABLET | Refills: 1 | Status: SHIPPED | OUTPATIENT
Start: 2018-07-02 | End: 2018-07-26

## 2018-07-02 RX ORDER — AMLODIPINE BESYLATE 5 MG/1
5 TABLET ORAL 2 TIMES DAILY
Qty: 180 TABLET | Refills: 0 | OUTPATIENT
Start: 2018-07-02

## 2018-07-25 PROBLEM — R42 DIZZINESS: Status: ACTIVE | Noted: 2018-07-25

## 2018-09-20 ENCOUNTER — TELEPHONE (OUTPATIENT)
Dept: NEPHROLOGY | Facility: CLINIC | Age: 58
End: 2018-09-20

## 2018-09-21 RX ORDER — LOSARTAN POTASSIUM 25 MG/1
50 TABLET ORAL DAILY
Qty: 30 TABLET | Refills: 11 | Status: SHIPPED | OUTPATIENT
Start: 2018-09-21 | End: 2018-10-21

## 2018-09-24 ENCOUNTER — TELEPHONE (OUTPATIENT)
Dept: NEPHROLOGY | Facility: CLINIC | Age: 58
End: 2018-09-24

## 2018-09-24 NOTE — TELEPHONE ENCOUNTER
Pt called. She said you Rx'd new BP medicine. Should she stop the old BP med?   Or take both? [phone]

## 2018-10-30 PROBLEM — Z94.0 S/P KIDNEY TRANSPLANT: Status: ACTIVE | Noted: 2018-10-30

## 2018-10-30 PROBLEM — Z94.0 S/P KIDNEY TRANSPLANT (HCC): Status: ACTIVE | Noted: 2018-10-30

## 2018-10-30 PROBLEM — R42 DIZZINESS: Status: RESOLVED | Noted: 2018-07-25 | Resolved: 2018-10-30

## 2019-02-13 RX ORDER — AMLODIPINE BESYLATE 5 MG/1
TABLET ORAL
Qty: 180 TABLET | Refills: 1 | OUTPATIENT
Start: 2019-02-13

## 2019-04-01 RX ORDER — AMLODIPINE BESYLATE 5 MG/1
TABLET ORAL
Qty: 180 TABLET | Refills: 1 | OUTPATIENT
Start: 2019-04-01

## 2019-07-18 ENCOUNTER — OFFICE VISIT (OUTPATIENT)
Dept: NEPHROLOGY | Facility: CLINIC | Age: 59
End: 2019-07-18
Payer: COMMERCIAL

## 2019-07-18 VITALS — SYSTOLIC BLOOD PRESSURE: 140 MMHG | DIASTOLIC BLOOD PRESSURE: 78 MMHG | BODY MASS INDEX: 31 KG/M2 | WEIGHT: 160 LBS

## 2019-07-18 DIAGNOSIS — Z79.4 TYPE 2 DIABETES MELLITUS WITHOUT COMPLICATION, WITH LONG-TERM CURRENT USE OF INSULIN (HCC): ICD-10-CM

## 2019-07-18 DIAGNOSIS — E11.9 TYPE 2 DIABETES MELLITUS WITHOUT COMPLICATION, WITH LONG-TERM CURRENT USE OF INSULIN (HCC): ICD-10-CM

## 2019-07-18 DIAGNOSIS — I10 ESSENTIAL HYPERTENSION: ICD-10-CM

## 2019-07-18 DIAGNOSIS — N18.30 CKD (CHRONIC KIDNEY DISEASE) STAGE 3, GFR 30-59 ML/MIN (HCC): Primary | ICD-10-CM

## 2019-07-18 DIAGNOSIS — Z94.0 KIDNEY TRANSPLANT RECIPIENT: ICD-10-CM

## 2019-07-18 PROCEDURE — 99214 OFFICE O/P EST MOD 30 MIN: CPT | Performed by: INTERNAL MEDICINE

## 2019-07-18 RX ORDER — GLIPIZIDE 5 MG/1
5 TABLET ORAL
COMMUNITY
End: 2019-12-24

## 2019-07-18 RX ORDER — AMLODIPINE BESYLATE 5 MG/1
5 TABLET ORAL DAILY
COMMUNITY
End: 2020-12-16

## 2019-07-18 RX ORDER — GLIPIZIDE 5 MG/1
5 TABLET ORAL
Qty: 180 TABLET | Refills: 3 | Status: SHIPPED | OUTPATIENT
Start: 2019-07-18 | End: 2019-12-24

## 2019-07-18 RX ORDER — ATOVAQUONE 750 MG/5ML
750 SUSPENSION ORAL DAILY
COMMUNITY
End: 2020-01-23 | Stop reason: ALTCHOICE

## 2019-07-18 NOTE — PROGRESS NOTES
Nephrology Progress Note      ASSESSMENT/PLAN:        1) s/p cad renal transplant 10/18 at [facility] for IgA nephropathy (male, 46s)- doing well with a baseline creatinine of 1.3 to 1.4 mg/dL; no other post transplant complications such as acute rejection, BK vir Procedure Laterality Date   • ESOPHAGOGASTRODUODENOSCOPY, COLONOSCOPY, POSSIBLE BIOPSY, POSSIBLE POLYPECTOMY 49245, 40578 N/A 6/1/2010    Performed by Kelly Drummond MD at [facility]   • TOTAL ABDOM HYSTERECTOMY        Family History   Pro SOB/cough/hemoptysis  Denies abd or flank pain  Denies N/V/D  Denies change in urinary habits or gross hematuria  Denies LE edema  Denies skin rashes/myalgias/arthralgias      PHYSICAL EXAM:   /78   Wt 160 lb   BMI 31.00 kg/m²   Wt Readings from Last

## 2019-10-24 ENCOUNTER — PATIENT MESSAGE (OUTPATIENT)
Dept: NEPHROLOGY | Facility: CLINIC | Age: 59
End: 2019-10-24

## 2019-10-26 RX ORDER — GLIPIZIDE 10 MG/1
10 TABLET ORAL
Qty: 180 TABLET | Refills: 11 | Status: SHIPPED | OUTPATIENT
Start: 2019-10-26 | End: 2020-12-16

## 2019-10-26 NOTE — TELEPHONE ENCOUNTER
From: April Can  To: Marisel Henry MD  Sent: 10/24/2019 10:13 AM CDT  Subject: Non-Urgent Medical Question    Dear Dr. Guille Red,    In the past weeks , my blood sugar had been very high.     Here are my numbers in this week:    Mon : 136 (morning)   237 (ev

## 2020-01-23 ENCOUNTER — OFFICE VISIT (OUTPATIENT)
Dept: NEPHROLOGY | Facility: CLINIC | Age: 60
End: 2020-01-23
Payer: COMMERCIAL

## 2020-01-23 VITALS — WEIGHT: 165 LBS | SYSTOLIC BLOOD PRESSURE: 116 MMHG | DIASTOLIC BLOOD PRESSURE: 68 MMHG | BODY MASS INDEX: 32 KG/M2

## 2020-01-23 DIAGNOSIS — Z94.0 KIDNEY TRANSPLANT RECIPIENT: ICD-10-CM

## 2020-01-23 DIAGNOSIS — I10 ESSENTIAL HYPERTENSION: ICD-10-CM

## 2020-01-23 DIAGNOSIS — N18.30 CKD (CHRONIC KIDNEY DISEASE) STAGE 3, GFR 30-59 ML/MIN (HCC): Primary | ICD-10-CM

## 2020-01-23 PROCEDURE — 99214 OFFICE O/P EST MOD 30 MIN: CPT | Performed by: INTERNAL MEDICINE

## 2020-01-23 RX ORDER — TACROLIMUS 1 MG/1
1 CAPSULE ORAL 2 TIMES DAILY
COMMUNITY

## 2020-01-23 RX ORDER — TACROLIMUS 0.5 MG/1
0.5 CAPSULE ORAL 2 TIMES DAILY
COMMUNITY

## 2020-01-23 RX ORDER — GLIPIZIDE 5 MG/1
5 TABLET ORAL EVERY EVENING
Qty: 90 TABLET | Refills: 3 | Status: SHIPPED | OUTPATIENT
Start: 2020-01-23 | End: 2020-02-11 | Stop reason: DRUGHIGH

## 2020-01-23 NOTE — PROGRESS NOTES
Nephrology Progress Note      ASSESSMENT/PLAN:        1) s/p cad renal transplant 10/18 at [facility] for IgA nephropathy (male, 46s)- doing well with a baseline creatinine of 1.3 to 1.4 mg/dL; no other post transplant complications such as acute rejection, BK vir POSSIBLE POLYPECTOMY 38933, 71942 N/A 6/1/2010    Performed by Yao Juarez MD at [facility]   • TOTAL ABDOM HYSTERECTOMY        Family History   Problem Relation Age of Onset   • Hypertension Mother    • Diabetes Mother    • Cancer Father EXAM:   /68   Wt 165 lb (74.8 kg)   BMI 31.95 kg/m²   Wt Readings from Last 3 Encounters:  01/23/20 : 165 lb (74.8 kg)  12/24/19 : 160 lb (72.6 kg)  07/18/19 : 160 lb (72.6 kg)    General: Alert and oriented in no apparent distress.   HEENT: No sclera

## 2020-02-10 ENCOUNTER — TELEPHONE (OUTPATIENT)
Dept: NEPHROLOGY | Facility: CLINIC | Age: 60
End: 2020-02-10

## 2020-02-10 NOTE — TELEPHONE ENCOUNTER
We received a fax from [employer] asking to clarify glipizide. It was written to take glipizide 10 mg one tab twice daily and also glipizide 5 mg take one tablet in the evening. How should patient be taking this ?     Inv # [id number]

## 2020-02-11 NOTE — TELEPHONE ENCOUNTER
Barber Kraus MD  You 14 hours ago (5:47 PM)     Saskia Jaquez-     It should be 10 mg bid-     Thx   Htf    Routing comment

## 2020-03-23 RX ORDER — LISINOPRIL 5 MG/1
5 TABLET ORAL DAILY
Qty: 90 TABLET | Refills: 1 | Status: SHIPPED | OUTPATIENT
Start: 2020-03-23 | End: 2020-09-25

## 2020-06-29 RX ORDER — GLIPIZIDE 5 MG/1
5 TABLET ORAL
Qty: 180 TABLET | Refills: 3 | OUTPATIENT
Start: 2020-06-29

## 2020-09-25 PROBLEM — E66.9 OBESITY (BMI 30-39.9): Status: ACTIVE | Noted: 2020-09-25

## 2020-09-25 PROBLEM — D84.9 IMMUNOSUPPRESSED STATUS (HCC): Status: ACTIVE | Noted: 2020-09-25

## 2020-10-05 NOTE — TELEPHONE ENCOUNTER
Metformin 500mg. Take 2 tablet BID. Erasmo August   Last filled-10/26/2019 for 1 year    LOV-1/23/20  NOV-none

## 2020-12-16 RX ORDER — AMLODIPINE BESYLATE 5 MG/1
5 TABLET ORAL DAILY
Qty: 30 TABLET | Refills: 0 | Status: SHIPPED | OUTPATIENT
Start: 2020-12-16 | End: 2021-01-08

## 2020-12-16 RX ORDER — GLIPIZIDE 10 MG/1
10 TABLET ORAL
Qty: 60 TABLET | Refills: 0 | Status: SHIPPED | OUTPATIENT
Start: 2020-12-16 | End: 2021-08-13

## 2021-01-08 RX ORDER — AMLODIPINE BESYLATE 5 MG/1
TABLET ORAL
Qty: 30 TABLET | Refills: 0 | Status: SHIPPED | OUTPATIENT
Start: 2021-01-08 | End: 2021-01-25

## 2021-01-20 RX ORDER — GLIPIZIDE 10 MG/1
TABLET ORAL
Qty: 60 TABLET | Refills: 11 | OUTPATIENT
Start: 2021-01-20

## 2021-01-25 RX ORDER — AMLODIPINE BESYLATE 5 MG/1
5 TABLET ORAL DAILY
Qty: 90 TABLET | Refills: 1 | Status: SHIPPED | OUTPATIENT
Start: 2021-01-25 | End: 2021-08-13

## 2021-02-09 DIAGNOSIS — Z23 NEED FOR VACCINATION: ICD-10-CM

## 2021-02-14 ENCOUNTER — IMMUNIZATION (OUTPATIENT)
Dept: LAB | Age: 61
End: 2021-02-14
Attending: HOSPITALIST
Payer: COMMERCIAL

## 2021-02-14 DIAGNOSIS — Z23 NEED FOR VACCINATION: Primary | ICD-10-CM

## 2021-02-14 PROCEDURE — 0001A SARSCOV2 VAC 30MCG/0.3ML IM: CPT

## 2021-03-07 ENCOUNTER — IMMUNIZATION (OUTPATIENT)
Dept: LAB | Age: 61
End: 2021-03-07
Attending: HOSPITALIST
Payer: COMMERCIAL

## 2021-03-07 DIAGNOSIS — Z23 NEED FOR VACCINATION: Primary | ICD-10-CM

## 2021-03-07 PROCEDURE — 0002A SARSCOV2 VAC 30MCG/0.3ML IM: CPT

## 2021-03-15 RX ORDER — AMLODIPINE BESYLATE 5 MG/1
TABLET ORAL
Qty: 30 TABLET | Refills: 0 | OUTPATIENT
Start: 2021-03-15

## 2021-04-08 ENCOUNTER — OFFICE VISIT (OUTPATIENT)
Dept: NEPHROLOGY | Facility: CLINIC | Age: 61
End: 2021-04-08
Payer: MEDICARE

## 2021-04-08 VITALS — WEIGHT: 164 LBS | SYSTOLIC BLOOD PRESSURE: 112 MMHG | DIASTOLIC BLOOD PRESSURE: 68 MMHG | BODY MASS INDEX: 32 KG/M2

## 2021-04-08 DIAGNOSIS — N18.30 STAGE 3 CHRONIC KIDNEY DISEASE, UNSPECIFIED WHETHER STAGE 3A OR 3B CKD (HCC): Primary | ICD-10-CM

## 2021-04-08 DIAGNOSIS — E11.9 TYPE 2 DIABETES MELLITUS WITHOUT COMPLICATION, WITHOUT LONG-TERM CURRENT USE OF INSULIN (HCC): ICD-10-CM

## 2021-04-08 DIAGNOSIS — I10 ESSENTIAL HYPERTENSION: ICD-10-CM

## 2021-04-08 PROCEDURE — 99214 OFFICE O/P EST MOD 30 MIN: CPT | Performed by: INTERNAL MEDICINE

## 2021-04-08 RX ORDER — LIDOCAINE HCL 4 %
CREAM (GRAM) TOPICAL
Qty: 500 STRIP | Refills: 3 | Status: SHIPPED | OUTPATIENT
Start: 2021-04-08

## 2021-04-08 NOTE — PROGRESS NOTES
Nephrology Progress Note      ASSESSMENT/PLAN:      1) s/p cad renal transplant 10/18 at [facility] for IgA nephropathy (male, 46s)- doing well with a baseline creatinine of 1.3 to 1.4 mg/dL; no other post transplant complications such as acute rejection, BK virem D deficiency       Past Surgical History:   Procedure Laterality Date   • ESOPHAGOGASTRODUODENOSCOPY, COLONOSCOPY, POSSIBLE BIOPSY, POSSIBLE POLYPECTOMY 11592, 13412 N/A 6/1/2010    Performed by Jazmin Obregon MD at [facility]   • KIDNEY AKSHAT BLOOD SUGARS TWICE A  strip 3       Allergies:    Pcn [Bicillin L-A]          ROS:     Denies fever/chills  Denies wt loss/gain  Denies HA or visual changes  Denies CP or palpitations  Denies SOB/cough/hemoptysis  Denies abd or flank pain  Denies N/

## 2021-09-13 DIAGNOSIS — Z94.0 STATUS POST KIDNEY TRANSPLANT: Primary | ICD-10-CM

## 2021-09-28 LAB
ABSOLUTE BASOPHILS: 63 CELLS/UL (ref 0–200)
ABSOLUTE EOSINOPHILS: 63 CELLS/UL (ref 15–500)
ABSOLUTE LYMPHOCYTES: 1745 CELLS/UL (ref 850–3900)
ABSOLUTE MONOCYTES: 611 CELLS/UL (ref 200–950)
ABSOLUTE NEUTROPHILS: 3818 CELLS/UL (ref 1500–7800)
APPEARANCE: CLEAR
BASOPHILS: 1 %
BILIRUBIN: NEGATIVE
BUN/CREATININE RATIO: 20 (CALC) (ref 6–22)
BUN: 31 MG/DL (ref 7–25)
CALCIUM: 9.9 MG/DL (ref 8.6–10.4)
CARBON DIOXIDE: 22 MMOL/L (ref 20–32)
CHLORIDE: 104 MMOL/L (ref 98–110)
COLOR: YELLOW
CREATININE: 1.53 MG/DL (ref 0.5–0.99)
EGFR IF AFRICN AM: 42 ML/MIN/1.73M2
EGFR IF NONAFRICN AM: 36 ML/MIN/1.73M2
EOSINOPHILS: 1 %
GLUCOSE: 176 MG/DL (ref 65–99)
GLUCOSE: NEGATIVE
HEMATOCRIT: 38.5 % (ref 35–45)
HEMOGLOBIN: 12.4 G/DL (ref 11.7–15.5)
KETONES: NEGATIVE
LYMPHOCYTES: 27.7 %
MCH: 28.8 PG (ref 27–33)
MCHC: 32.2 G/DL (ref 32–36)
MCV: 89.5 FL (ref 80–100)
MONOCYTES: 9.7 %
MPV: 10.2 FL (ref 7.5–12.5)
NEUTROPHILS: 60.6 %
NITRITE: NEGATIVE
OCCULT BLOOD: NEGATIVE
PH: 5.5 (ref 5–8)
PLATELET COUNT: 254 THOUSAND/UL (ref 140–400)
POTASSIUM: 4.4 MMOL/L (ref 3.5–5.3)
PROTEIN: NEGATIVE
RDW: 12.7 % (ref 11–15)
RED BLOOD CELL COUNT: 4.3 MILLION/UL (ref 3.8–5.1)
SODIUM: 136 MMOL/L (ref 135–146)
SPECIFIC GRAVITY: 1.01 (ref 1–1.03)
TACROLIMUS, HIGHLY SENSITIVE, /MS/MS: 9.5 MCG/L
WHITE BLOOD CELL COUNT: 6.3 THOUSAND/UL (ref 3.8–10.8)

## 2021-09-29 ENCOUNTER — TELEPHONE (OUTPATIENT)
Dept: NEPHROLOGY | Facility: CLINIC | Age: 61
End: 2021-09-29

## 2021-09-29 NOTE — TELEPHONE ENCOUNTER
Left VM for pt- renal function stable; UA bland; prograf 9.5 ( a bit high)- ongoing mgmt per [facility]- thx nadira

## 2022-03-30 RX ORDER — SITAGLIPTIN AND METFORMIN HYDROCHLORIDE 1000; 50 MG/1; MG/1
TABLET, FILM COATED ORAL
Qty: 120 TABLET | Refills: 5 | OUTPATIENT
Start: 2022-03-30

## 2022-05-20 ENCOUNTER — APPOINTMENT (OUTPATIENT)
Dept: GENERAL RADIOLOGY | Facility: HOSPITAL | Age: 62
End: 2022-05-20
Payer: COMMERCIAL

## 2022-05-20 ENCOUNTER — HOSPITAL ENCOUNTER (EMERGENCY)
Facility: HOSPITAL | Age: 62
Discharge: HOME | End: 2022-05-20
Attending: EMERGENCY MEDICINE
Payer: COMMERCIAL

## 2022-05-20 VITALS
BODY MASS INDEX: 29.83 KG/M2 | TEMPERATURE: 98 F | RESPIRATION RATE: 21 BRPM | HEART RATE: 78 BPM | WEIGHT: 158 LBS | SYSTOLIC BLOOD PRESSURE: 114 MMHG | HEIGHT: 61 IN | OXYGEN SATURATION: 100 % | DIASTOLIC BLOOD PRESSURE: 80 MMHG

## 2022-05-20 DIAGNOSIS — E11.65 TYPE 2 DIABETES MELLITUS WITH HYPERGLYCEMIA, WITHOUT LONG-TERM CURRENT USE OF INSULIN (HCC): Primary | ICD-10-CM

## 2022-05-20 DIAGNOSIS — R07.89 CHEST PAIN, NON-CARDIAC: ICD-10-CM

## 2022-05-20 LAB
ACETONE: NEGATIVE
ALBUMIN SERPL-MCNC: 3.1 G/DL (ref 3.4–5)
ALBUMIN/GLOB SERPL: 0.9 {RATIO} (ref 1–2)
ALP LIVER SERPL-CCNC: 44 U/L
ALT SERPL-CCNC: 16 U/L
ANION GAP SERPL CALC-SCNC: 8 MMOL/L (ref 0–18)
AST SERPL-CCNC: 10 U/L (ref 15–37)
BASOPHILS # BLD: 0.06 X10(3) UL (ref 0–0.2)
BASOPHILS NFR BLD: 1 %
BILIRUB SERPL-MCNC: 0.5 MG/DL (ref 0.1–2)
BUN BLD-MCNC: 39 MG/DL (ref 7–18)
CALCIUM BLD-MCNC: 8.9 MG/DL (ref 8.5–10.1)
CHLORIDE SERPL-SCNC: 113 MMOL/L (ref 98–112)
CO2 SERPL-SCNC: 16 MMOL/L (ref 21–32)
CREAT BLD-MCNC: 2.1 MG/DL
EOSINOPHIL # BLD: 0 X10(3) UL (ref 0–0.7)
EOSINOPHIL NFR BLD: 0 %
ERYTHROCYTE [DISTWIDTH] IN BLOOD BY AUTOMATED COUNT: 12.9 %
EST. AVERAGE GLUCOSE BLD GHB EST-MCNC: 166 MG/DL (ref 68–126)
GLOBULIN PLAS-MCNC: 3.6 G/DL (ref 2.8–4.4)
GLUCOSE BLD-MCNC: 172 MG/DL (ref 70–99)
GLUCOSE BLD-MCNC: 229 MG/DL (ref 70–99)
GLUCOSE BLD-MCNC: 339 MG/DL (ref 70–99)
HBA1C MFR BLD: 7.4 % (ref ?–5.7)
HCT VFR BLD AUTO: 35 %
HGB BLD-MCNC: 10.8 G/DL
LYMPHOCYTES NFR BLD: 0.9 X10(3) UL (ref 1–4)
LYMPHOCYTES NFR BLD: 15 %
MCH RBC QN AUTO: 28.7 PG (ref 26–34)
MCHC RBC AUTO-ENTMCNC: 30.9 G/DL (ref 31–37)
MCV RBC AUTO: 93.1 FL
METAMYELOCYTES # BLD: 0.06 X10(3) UL
METAMYELOCYTES NFR BLD: 1 %
MONOCYTES # BLD: 0.3 X10(3) UL (ref 0.1–1)
MONOCYTES NFR BLD: 5 %
MORPHOLOGY: NORMAL
MYELOCYTES # BLD: 0.06 X10(3) UL
MYELOCYTES NFR BLD: 1 %
NEUTROPHILS # BLD AUTO: 4.02 X10 (3) UL (ref 1.5–7.7)
NEUTROPHILS NFR BLD: 76 %
NEUTS BAND NFR BLD: 1 %
NEUTS HYPERSEG # BLD: 4.62 X10(3) UL (ref 1.5–7.7)
NT-PROBNP SERPL-MCNC: 219 PG/ML (ref ?–125)
OSMOLALITY SERPL CALC.SUM OF ELEC: 307 MOSM/KG (ref 275–295)
PLATELET # BLD AUTO: 214 10(3)UL (ref 150–450)
PLATELET MORPHOLOGY: NORMAL
POTASSIUM SERPL-SCNC: 4.3 MMOL/L (ref 3.5–5.1)
PROT SERPL-MCNC: 6.7 G/DL (ref 6.4–8.2)
RBC # BLD AUTO: 3.76 X10(6)UL
SARS-COV-2 RNA RESP QL NAA+PROBE: NOT DETECTED
SODIUM SERPL-SCNC: 137 MMOL/L (ref 136–145)
TOTAL CELLS COUNTED BLD: 100
TROPONIN I HIGH SENSITIVITY: 44 NG/L
WBC # BLD AUTO: 6 X10(3) UL (ref 4–11)

## 2022-05-20 PROCEDURE — 83880 ASSAY OF NATRIURETIC PEPTIDE: CPT | Performed by: EMERGENCY MEDICINE

## 2022-05-20 PROCEDURE — 3051F HG A1C>EQUAL 7.0%<8.0%: CPT | Performed by: STUDENT IN AN ORGANIZED HEALTH CARE EDUCATION/TRAINING PROGRAM

## 2022-05-20 PROCEDURE — 85027 COMPLETE CBC AUTOMATED: CPT

## 2022-05-20 PROCEDURE — 93005 ELECTROCARDIOGRAM TRACING: CPT

## 2022-05-20 PROCEDURE — 93010 ELECTROCARDIOGRAM REPORT: CPT

## 2022-05-20 PROCEDURE — 82962 GLUCOSE BLOOD TEST: CPT

## 2022-05-20 PROCEDURE — 80053 COMPREHEN METABOLIC PANEL: CPT

## 2022-05-20 PROCEDURE — 83880 ASSAY OF NATRIURETIC PEPTIDE: CPT

## 2022-05-20 PROCEDURE — 85007 BL SMEAR W/DIFF WBC COUNT: CPT | Performed by: EMERGENCY MEDICINE

## 2022-05-20 PROCEDURE — 99285 EMERGENCY DEPT VISIT HI MDM: CPT

## 2022-05-20 PROCEDURE — 83036 HEMOGLOBIN GLYCOSYLATED A1C: CPT | Performed by: EMERGENCY MEDICINE

## 2022-05-20 PROCEDURE — 85007 BL SMEAR W/DIFF WBC COUNT: CPT

## 2022-05-20 PROCEDURE — 85027 COMPLETE CBC AUTOMATED: CPT | Performed by: EMERGENCY MEDICINE

## 2022-05-20 PROCEDURE — 82009 KETONE BODYS QUAL: CPT | Performed by: EMERGENCY MEDICINE

## 2022-05-20 PROCEDURE — 71045 X-RAY EXAM CHEST 1 VIEW: CPT

## 2022-05-20 PROCEDURE — 80053 COMPREHEN METABOLIC PANEL: CPT | Performed by: EMERGENCY MEDICINE

## 2022-05-20 PROCEDURE — 85025 COMPLETE CBC W/AUTO DIFF WBC: CPT

## 2022-05-20 PROCEDURE — 96360 HYDRATION IV INFUSION INIT: CPT

## 2022-05-20 PROCEDURE — 84484 ASSAY OF TROPONIN QUANT: CPT | Performed by: EMERGENCY MEDICINE

## 2022-05-20 RX ORDER — INSULIN ASPART 100 [IU]/ML
0.1 INJECTION, SOLUTION INTRAVENOUS; SUBCUTANEOUS ONCE
Status: COMPLETED | OUTPATIENT
Start: 2022-05-20 | End: 2022-05-20

## 2022-05-20 RX ORDER — INSULIN ASPART 100 [IU]/ML
0.15 INJECTION, SOLUTION INTRAVENOUS; SUBCUTANEOUS ONCE
Status: DISCONTINUED | OUTPATIENT
Start: 2022-05-20 | End: 2022-05-20

## 2022-05-20 RX ORDER — METRONIDAZOLE 500 MG/1
500 TABLET ORAL 3 TIMES DAILY
COMMUNITY

## 2022-05-20 RX ORDER — CLARITHROMYCIN 500 MG/1
500 TABLET, COATED ORAL 2 TIMES DAILY
COMMUNITY

## 2022-05-20 RX ORDER — PANTOPRAZOLE SODIUM 40 MG/1
40 TABLET, DELAYED RELEASE ORAL
COMMUNITY

## 2022-05-21 ENCOUNTER — HOSPITAL ENCOUNTER (EMERGENCY)
Facility: HOSPITAL | Age: 62
Discharge: LEFT BEFORE DISCHARGE | End: 2022-05-21
Payer: COMMERCIAL

## 2022-05-21 ENCOUNTER — HOSPITAL ENCOUNTER (EMERGENCY)
Facility: HOSPITAL | Age: 62
Discharge: HOME | End: 2022-05-21
Attending: EMERGENCY MEDICINE
Payer: COMMERCIAL

## 2022-05-21 VITALS
BODY MASS INDEX: 31.02 KG/M2 | OXYGEN SATURATION: 100 % | HEART RATE: 77 BPM | HEIGHT: 60 IN | DIASTOLIC BLOOD PRESSURE: 88 MMHG | SYSTOLIC BLOOD PRESSURE: 127 MMHG | TEMPERATURE: 98 F | WEIGHT: 158 LBS | RESPIRATION RATE: 20 BRPM

## 2022-05-21 DIAGNOSIS — E16.2 HYPOGLYCEMIA: Primary | ICD-10-CM

## 2022-05-21 LAB
ALBUMIN SERPL-MCNC: 3.2 G/DL (ref 3.4–5)
ALBUMIN/GLOB SERPL: 0.9 {RATIO} (ref 1–2)
ALP LIVER SERPL-CCNC: 41 U/L
ALT SERPL-CCNC: 19 U/L
ANION GAP SERPL CALC-SCNC: <0 MMOL/L (ref 0–18)
AST SERPL-CCNC: 15 U/L (ref 15–37)
BASOPHILS # BLD AUTO: 0.04 X10(3) UL (ref 0–0.2)
BASOPHILS NFR BLD AUTO: 0.6 %
BILIRUB SERPL-MCNC: 0.4 MG/DL (ref 0.1–2)
BUN BLD-MCNC: 27 MG/DL (ref 7–18)
CALCIUM BLD-MCNC: 9.5 MG/DL (ref 8.5–10.1)
CHLORIDE SERPL-SCNC: 120 MMOL/L (ref 98–112)
CO2 SERPL-SCNC: 20 MMOL/L (ref 21–32)
CREAT BLD-MCNC: 1.68 MG/DL
EOSINOPHIL # BLD AUTO: 0.08 X10(3) UL (ref 0–0.7)
EOSINOPHIL NFR BLD AUTO: 1.3 %
ERYTHROCYTE [DISTWIDTH] IN BLOOD BY AUTOMATED COUNT: 12.9 %
GLOBULIN PLAS-MCNC: 3.7 G/DL (ref 2.8–4.4)
GLUCOSE BLD-MCNC: 119 MG/DL (ref 70–99)
GLUCOSE BLD-MCNC: 131 MG/DL (ref 70–99)
GLUCOSE BLD-MCNC: 223 MG/DL (ref 70–99)
GLUCOSE BLD-MCNC: 52 MG/DL (ref 70–99)
GLUCOSE BLD-MCNC: 78 MG/DL (ref 70–99)
GLUCOSE BLD-MCNC: 88 MG/DL (ref 70–99)
HCT VFR BLD AUTO: 37.9 %
HGB BLD-MCNC: 11.9 G/DL
IMM GRANULOCYTES # BLD AUTO: 0.31 X10(3) UL (ref 0–1)
IMM GRANULOCYTES NFR BLD: 4.9 %
LYMPHOCYTES # BLD AUTO: 1.61 X10(3) UL (ref 1–4)
LYMPHOCYTES NFR BLD AUTO: 25.3 %
MCH RBC QN AUTO: 29.1 PG (ref 26–34)
MCHC RBC AUTO-ENTMCNC: 31.4 G/DL (ref 31–37)
MCV RBC AUTO: 92.7 FL
MONOCYTES # BLD AUTO: 0.79 X10(3) UL (ref 0.1–1)
MONOCYTES NFR BLD AUTO: 12.4 %
NEUTROPHILS # BLD AUTO: 3.54 X10 (3) UL (ref 1.5–7.7)
NEUTROPHILS # BLD AUTO: 3.54 X10(3) UL (ref 1.5–7.7)
NEUTROPHILS NFR BLD AUTO: 55.5 %
OSMOLALITY SERPL CALC.SUM OF ELEC: 287 MOSM/KG (ref 275–295)
PLATELET # BLD AUTO: 226 10(3)UL (ref 150–450)
POTASSIUM SERPL-SCNC: 3.9 MMOL/L (ref 3.5–5.1)
PROT SERPL-MCNC: 6.9 G/DL (ref 6.4–8.2)
RBC # BLD AUTO: 4.09 X10(6)UL
SODIUM SERPL-SCNC: 137 MMOL/L (ref 136–145)
WBC # BLD AUTO: 6.4 X10(3) UL (ref 4–11)

## 2022-05-21 PROCEDURE — 82962 GLUCOSE BLOOD TEST: CPT

## 2022-05-21 PROCEDURE — 36415 COLL VENOUS BLD VENIPUNCTURE: CPT

## 2022-05-21 PROCEDURE — 80053 COMPREHEN METABOLIC PANEL: CPT | Performed by: EMERGENCY MEDICINE

## 2022-05-21 PROCEDURE — 85025 COMPLETE CBC W/AUTO DIFF WBC: CPT | Performed by: EMERGENCY MEDICINE

## 2022-05-21 PROCEDURE — 99283 EMERGENCY DEPT VISIT LOW MDM: CPT

## 2022-05-21 PROCEDURE — 99285 EMERGENCY DEPT VISIT HI MDM: CPT

## 2022-05-21 RX ORDER — SODIUM CHLORIDE 9 MG/ML
INJECTION, SOLUTION INTRAVENOUS CONTINUOUS
Status: DISCONTINUED | OUTPATIENT
Start: 2022-05-21 | End: 2022-05-21

## 2022-05-21 NOTE — ED QUICK NOTES
Pt was seen here yesterday for hypoglycemia. Pt states BGL was 44 at home PTA and treated with orange juice. Pt accucheck was 88 on arrival. She is a Type II diabetic.

## 2022-05-23 LAB
ATRIAL RATE: 77 BPM
P AXIS: 80 DEGREES
P-R INTERVAL: 186 MS
Q-T INTERVAL: 394 MS
QRS DURATION: 72 MS
QTC CALCULATION (BEZET): 445 MS
R AXIS: -14 DEGREES
T AXIS: 47 DEGREES
VENTRICULAR RATE: 77 BPM

## 2022-05-24 ENCOUNTER — OFFICE VISIT (OUTPATIENT)
Dept: ENDOCRINOLOGY CLINIC | Facility: CLINIC | Age: 62
End: 2022-05-24
Payer: COMMERCIAL

## 2022-05-24 VITALS
HEART RATE: 73 BPM | OXYGEN SATURATION: 94 % | BODY MASS INDEX: 31 KG/M2 | WEIGHT: 158.19 LBS | DIASTOLIC BLOOD PRESSURE: 70 MMHG | SYSTOLIC BLOOD PRESSURE: 118 MMHG

## 2022-05-24 DIAGNOSIS — E11.21 TYPE 2 DIABETES MELLITUS WITH DIABETIC NEPHROPATHY, WITHOUT LONG-TERM CURRENT USE OF INSULIN (HCC): Primary | ICD-10-CM

## 2022-05-24 DIAGNOSIS — Z94.0 S/P KIDNEY TRANSPLANT: ICD-10-CM

## 2022-05-24 DIAGNOSIS — N18.32 STAGE 3B CHRONIC KIDNEY DISEASE (HCC): ICD-10-CM

## 2022-05-24 PROCEDURE — 99205 OFFICE O/P NEW HI 60 MIN: CPT | Performed by: STUDENT IN AN ORGANIZED HEALTH CARE EDUCATION/TRAINING PROGRAM

## 2022-05-24 PROCEDURE — 3078F DIAST BP <80 MM HG: CPT | Performed by: STUDENT IN AN ORGANIZED HEALTH CARE EDUCATION/TRAINING PROGRAM

## 2022-05-24 PROCEDURE — 3074F SYST BP LT 130 MM HG: CPT | Performed by: STUDENT IN AN ORGANIZED HEALTH CARE EDUCATION/TRAINING PROGRAM

## 2022-05-24 RX ORDER — FLASH GLUCOSE SCANNING READER
1 EACH MISCELLANEOUS DAILY
Qty: 1 EACH | Refills: 0 | Status: SHIPPED | OUTPATIENT
Start: 2022-05-24

## 2022-05-24 RX ORDER — GLIPIZIDE 5 MG/1
2.5 TABLET ORAL 2 TIMES DAILY
Qty: 45 TABLET | Refills: 1 | Status: SHIPPED | OUTPATIENT
Start: 2022-05-24

## 2022-05-24 RX ORDER — FLASH GLUCOSE SENSOR
1 KIT MISCELLANEOUS
Qty: 2 EACH | Refills: 2 | Status: SHIPPED | OUTPATIENT
Start: 2022-05-24

## 2022-05-24 NOTE — PATIENT INSTRUCTIONS
Your most recent estimated GFR is 32. With this level, I do not recommend you continue metformin as you have an increased risk of lactic acidosis.   I recommend to stop the Janumet and keep the sitagliptin at 50mg (take ONCE daily)  I would also recommend you decrease the glipizide 10mg twice a day to 2.5mg twice a day (this is a good medicine for diabetes but it can cause low blood sugar if the kidney function isn't good)    Wear the CGM [facility] Dorothy    We discussed the rule of 15/15

## 2022-05-26 ENCOUNTER — TELEPHONE (OUTPATIENT)
Dept: ENDOCRINOLOGY CLINIC | Facility: CLINIC | Age: 62
End: 2022-05-26

## 2022-05-26 NOTE — TELEPHONE ENCOUNTER
Called pt back to discuss BGs. Currently fasting , then BG high 200s after lunch. After our visit, due to decreased GFR of 32, had to DC Janumet to switch out metformin, lower glipizide, and resume low dose [place] and [place]. Based on current BG, will do the following:  - resume metformin 500mg daily, max 500mg BID (this will require close functioning of kidney function)  - continue glipizide 2.5mg twice a day (potentially can increase to 5mg, but not now)  - continue januvia 50mg daily (this is the max)    Pt has appt with new nephrologist tomorrow, asked her to check with nephro re: GFR and if appropriate to stay on metformin.  Otherwise, may need to start low dose basal insulin until GFR improves

## 2022-05-26 NOTE — TELEPHONE ENCOUNTER
Patient called in stating she is currently taking:SITagliptin Phosphate 50 MG Oral Tablet (Clifton Harrell). Patient stated she checked her blood sugar and the readings were:  170 at Breakfast  275 at [facility]   289 currently    Patient is concerned that her levels are too high and would like to know what to do. Patient would like call back.

## 2022-06-01 ENCOUNTER — TELEPHONE (OUTPATIENT)
Dept: ENDOCRINOLOGY CLINIC | Facility: CLINIC | Age: 62
End: 2022-06-01

## 2022-06-01 RX ORDER — PEN NEEDLE, DIABETIC 32GX 5/32"
NEEDLE, DISPOSABLE MISCELLANEOUS
Qty: 100 EACH | Refills: 3 | Status: SHIPPED | OUTPATIENT
Start: 2022-06-01

## 2022-06-01 NOTE — TELEPHONE ENCOUNTER
Called pt back re: high BG. Since last conversation on 5/26, pt has been compliant with new medication regimen of metformin 500mg BID, glipizide 2.5mg TID, januvia 50mg daily. She has since seen new nephrologist at [facility] as well who is ok with the doses based on her diminished GFR. Pt is wearing Freestyle Amy and states that her BG has mainly been in high 200s, however today she checked 2x fingerstick and both were in the low 400s. Freestyle amy states high. Pt feels well overall but a little thirsty, no increased urination. Discussed that if this current oral DM regimen is inadequate to maintain euglycemia, we can not further increase the doses based on her GFR. In this case, will need to start insulin.   - pt already took her oral meds today  - will limit carbs for rest of the night  - will start lantus solostar 10U q daily (rx sent to pharmacy)  - if BG not dropping <400 tonight despite insulin and hydration, will need to go to ED; pt verbalizes understanding  - will follow up tomorrow morning and decide on initiating basal (will need to DC glipizide in that case) to prevent post-prandial hypoglycemia    Lis Abdullahi MD

## 2022-06-02 ENCOUNTER — TELEPHONE (OUTPATIENT)
Dept: ENDOCRINOLOGY CLINIC | Facility: CLINIC | Age: 62
End: 2022-06-02

## 2022-06-02 NOTE — TELEPHONE ENCOUNTER
Spoke with pt via phone. Verified name and . Per Dr. Tito Osgood pt can continue taking metformin and glipizide. She may decrease her metformin to 1/2 tabletif she notices her blood sugars going too low. Pt verbalized understanding. Pt will continue to let us know her blood sugar trends.

## 2022-06-02 NOTE — TELEPHONE ENCOUNTER
Patient stated she had called Dr. Josselin Garcia yesterday with high blood sugar. Patient called this morning with BS reading. It is 122. She is asking since she is taking 10 units of insulin at night, should she still be taking the metformin and glipizide in the evening as well.

## 2022-06-08 ENCOUNTER — OFFICE VISIT (OUTPATIENT)
Dept: ENDOCRINOLOGY CLINIC | Facility: CLINIC | Age: 62
End: 2022-06-08
Payer: COMMERCIAL

## 2022-06-08 VITALS
WEIGHT: 155 LBS | BODY MASS INDEX: 30 KG/M2 | SYSTOLIC BLOOD PRESSURE: 112 MMHG | DIASTOLIC BLOOD PRESSURE: 76 MMHG | HEART RATE: 70 BPM

## 2022-06-08 DIAGNOSIS — E11.21 TYPE 2 DIABETES MELLITUS WITH DIABETIC NEPHROPATHY, WITHOUT LONG-TERM CURRENT USE OF INSULIN (HCC): Primary | ICD-10-CM

## 2022-06-08 DIAGNOSIS — N18.32 STAGE 3B CHRONIC KIDNEY DISEASE (HCC): ICD-10-CM

## 2022-06-08 PROCEDURE — 3074F SYST BP LT 130 MM HG: CPT | Performed by: STUDENT IN AN ORGANIZED HEALTH CARE EDUCATION/TRAINING PROGRAM

## 2022-06-08 PROCEDURE — 95251 CONT GLUC MNTR ANALYSIS I&R: CPT | Performed by: STUDENT IN AN ORGANIZED HEALTH CARE EDUCATION/TRAINING PROGRAM

## 2022-06-08 PROCEDURE — 99215 OFFICE O/P EST HI 40 MIN: CPT | Performed by: STUDENT IN AN ORGANIZED HEALTH CARE EDUCATION/TRAINING PROGRAM

## 2022-06-08 PROCEDURE — 3078F DIAST BP <80 MM HG: CPT | Performed by: STUDENT IN AN ORGANIZED HEALTH CARE EDUCATION/TRAINING PROGRAM

## 2022-06-08 RX ORDER — SEMAGLUTIDE 1.34 MG/ML
0.25 INJECTION, SOLUTION SUBCUTANEOUS
Qty: 4 EACH | Refills: 0 | Status: SHIPPED | OUTPATIENT
Start: 2022-06-08 | End: 2022-07-06

## 2022-06-08 RX ORDER — PEN NEEDLE, DIABETIC 31 GX5/16"
NEEDLE, DISPOSABLE MISCELLANEOUS
Qty: 12 EACH | Refills: 1 | Status: SHIPPED | OUTPATIENT
Start: 2022-06-08

## 2022-06-08 RX ORDER — SEMAGLUTIDE 1.34 MG/ML
0.5 INJECTION, SOLUTION SUBCUTANEOUS
Qty: 4 EACH | Refills: 1 | Status: SHIPPED | OUTPATIENT
Start: 2022-06-08

## 2022-06-08 NOTE — PATIENT INSTRUCTIONS
Stop the Januvia once you start the Ozempic  Continue Lantus, Metformin and Glipizide  If blood sugar decreases too much from the post-meal exercise, can trial holding the glipizide if you know you're going to exercise aggressively afterwards  Referral to diabetes center for both nutrition counseling and medication titration

## 2022-06-13 ENCOUNTER — DIABETIC EDUCATION (OUTPATIENT)
Dept: ENDOCRINOLOGY CLINIC | Facility: CLINIC | Age: 62
End: 2022-06-13
Payer: COMMERCIAL

## 2022-06-13 DIAGNOSIS — E11.65 TYPE 2 DIABETES MELLITUS WITH HYPERGLYCEMIA, UNSPECIFIED WHETHER LONG TERM INSULIN USE (HCC): Primary | ICD-10-CM

## 2022-06-13 NOTE — PROGRESS NOTES
Gomez Tinoco Hsue   3/13/1960 was seen for Medical Nutrition Therapy with Diabetes:    6/13/2022  Referring Provider: Dr. Trenda Curling  Start time: 10:00 End time: 11:00    HEMOGLOBIN A1C (%)   Date Value   09/25/2020 8.1 (A)   01/04/2008 8.7 (H)     HbA1c (%)   Date Value   03/19/2022 7.9 (H)     HgbA1C (%)   Date Value   05/20/2022 7.4 (H)     Here with . Was in the E.R. three times in a short period of time with both hyper and hypoglycemia. Since then she has been eating on schedule. Has had T2DB x many years. S/p CRT three years ago - not due to T2DB or blood pressure. Brunch (10am): small bowl rice, veg, meat   Beverage: coffee (black)    Dinner (3-4pm): whole plate salad, meat, dumpling or rice. Beverage: water or [place]    HS chips or nuts but no longer. 10pm - baby bell cheese (small)    Exercise: is walking after each meal in her house 15-20 minutes. On Mondays and Thursdays she goes to dance class. She also walks outside. Reviewed HgbA1C and target A1c levels. Reviewed target BG levels FBS  and pp <150 ideally. Pt is wearing Amy but didn't bring reader. Instructed pt to bring reader when she sees diabetes provider. Reviewed Type 2 diabetes as a diagnosis. Discussed insulin resistance and tools to treat: diet, regular physical activity, diabetes medications, and stress management. Also discussed natural progression of Type 2 diabetes and ways to slow progression: regular physical activity, good blood glucose control and avoid weight gain/possibly reduce weight. Taught label reading: focus on counting grams of carb rather than looking at sugar. Reviewed current diabetes medications:   Metformin 500 mg BID  Glipizide 2.5 mg BID - takes before breakfast and dinner  Ozempic 0.25 mg weekly (new)  Lantus 10 units (new)    Discussed Rule of 15 for treating hypoglycemia.     Discussed macronutrient balance: reduce starch, include lean protein and non-starchy vegetables, also fruit, yogurt and milk using food models. Gave examples of heart healthy, balanced meals with 30-45 grams of carbohydrate/meal.     Reviewed principles for heart healthy diet: reduced saturated fat, reduced sodium and high fiber. Exercise: Discussed benefits of regular physical activity and goal to complete 30 minutes daily. Patient set diabetes self-management goals: continue balancing carbs with protein and doing her walking. Patient is willing to make lifestyle changes to improve blood glucose control. Written materials provided for all topics covered. Patient verbalized understanding and has no further questions at this time. Thank you for the referral.  Follow-up plan: diabetes provider 6/21 and endo 9/7. Patient to contact diabetes center for questions/concerns.   Paulette Lew MS, RD, CDCES, LDN

## 2022-06-21 ENCOUNTER — OFFICE VISIT (OUTPATIENT)
Dept: ENDOCRINOLOGY CLINIC | Facility: CLINIC | Age: 62
End: 2022-06-21
Payer: COMMERCIAL

## 2022-06-21 VITALS
RESPIRATION RATE: 16 BRPM | DIASTOLIC BLOOD PRESSURE: 64 MMHG | SYSTOLIC BLOOD PRESSURE: 110 MMHG | BODY MASS INDEX: 30.23 KG/M2 | WEIGHT: 154 LBS | OXYGEN SATURATION: 99 % | HEIGHT: 60 IN | HEART RATE: 74 BPM

## 2022-06-21 DIAGNOSIS — Z79.4 TYPE 2 DIABETES MELLITUS WITH DIABETIC NEPHROPATHY, WITH LONG-TERM CURRENT USE OF INSULIN (HCC): Primary | ICD-10-CM

## 2022-06-21 DIAGNOSIS — E11.21 TYPE 2 DIABETES MELLITUS WITH DIABETIC NEPHROPATHY, WITH LONG-TERM CURRENT USE OF INSULIN (HCC): Primary | ICD-10-CM

## 2022-06-21 PROCEDURE — 95251 CONT GLUC MNTR ANALYSIS I&R: CPT | Performed by: NURSE PRACTITIONER

## 2022-06-21 PROCEDURE — 3008F BODY MASS INDEX DOCD: CPT | Performed by: NURSE PRACTITIONER

## 2022-06-21 PROCEDURE — 99215 OFFICE O/P EST HI 40 MIN: CPT | Performed by: NURSE PRACTITIONER

## 2022-06-21 PROCEDURE — 3074F SYST BP LT 130 MM HG: CPT | Performed by: NURSE PRACTITIONER

## 2022-06-21 PROCEDURE — 3078F DIAST BP <80 MM HG: CPT | Performed by: NURSE PRACTITIONER

## 2022-06-21 RX ORDER — GLUCAGON INJECTION, SOLUTION 1 MG/.2ML
1 INJECTION, SOLUTION SUBCUTANEOUS ONCE AS NEEDED
Qty: 0.4 ML | Refills: 0 | Status: SHIPPED | OUTPATIENT
Start: 2022-06-21 | End: 2022-06-21

## 2022-06-21 RX ORDER — FLASH GLUCOSE SENSOR
1 KIT MISCELLANEOUS
Qty: 6 EACH | Refills: 1 | Status: SHIPPED | OUTPATIENT
Start: 2022-06-21

## 2022-06-24 LAB — HEMOGLOBIN A1C: 7.1 % OF TOTAL HGB

## 2022-06-28 ENCOUNTER — TELEPHONE (OUTPATIENT)
Dept: ENDOCRINOLOGY CLINIC | Facility: CLINIC | Age: 62
End: 2022-06-28

## 2022-06-28 RX ORDER — GLIPIZIDE 5 MG/1
2.5 TABLET ORAL
Qty: 45 TABLET | Refills: 0 | COMMUNITY
Start: 2022-06-28

## 2022-06-28 NOTE — TELEPHONE ENCOUNTER
Spoke with patient on telephone. States for the last 4 days her blood sugars have been going low over night- 60/62/68. Happening over night between 1am-2am. Last night when happened she took 1 glucose tab, had a cheese and yogurt. This morning on waking up was 106. Denies any low blood sugars during the day. Confirmed she is taking the following medications:     Ozempic: 0.25mg once a week. Insulin Glargine- 10 units at night  Metformin- 500mg two times a day  Glipizide- 2.5mg twice day    Patient states last night she did not take her Glipizide 2.5mg. Reviewed will discuss with Dr. Dalia Lutz and f/u with patient with recommendations.

## 2022-06-28 NOTE — TELEPHONE ENCOUNTER
Spoke with patient on telephone. Reviewed below information. Patient verbalized understanding. Per Dr. Collette Rast- Glipizide 2.5mg once a day before a meal- prefers to be either breakfast or lunch. Per Dr. Collette Rast- if patient still experiencing low numbers-f/u Thursday 6/30- at that time would look at lowering Glargine. Patient verbalized understanding of all. Med list updated with new rx for Glipizide.

## 2022-06-28 NOTE — TELEPHONE ENCOUNTER
Per Dr. Hurt Todd \"Lower to glipizide 2.5mg once a day. If still seeing lows after that, can lower lantus from 10u to 8u daily\". Phoned patient. No answer. LM asking for call back.

## 2022-08-09 ENCOUNTER — OFFICE VISIT (OUTPATIENT)
Dept: ENDOCRINOLOGY CLINIC | Facility: CLINIC | Age: 62
End: 2022-08-09
Payer: COMMERCIAL

## 2022-08-09 VITALS
DIASTOLIC BLOOD PRESSURE: 78 MMHG | HEART RATE: 76 BPM | OXYGEN SATURATION: 97 % | RESPIRATION RATE: 16 BRPM | BODY MASS INDEX: 30 KG/M2 | SYSTOLIC BLOOD PRESSURE: 112 MMHG | WEIGHT: 152 LBS

## 2022-08-09 DIAGNOSIS — E11.21 TYPE 2 DIABETES MELLITUS WITH DIABETIC NEPHROPATHY, WITH LONG-TERM CURRENT USE OF INSULIN (HCC): Primary | ICD-10-CM

## 2022-08-09 DIAGNOSIS — Z79.4 TYPE 2 DIABETES MELLITUS WITH DIABETIC NEPHROPATHY, WITH LONG-TERM CURRENT USE OF INSULIN (HCC): Primary | ICD-10-CM

## 2022-08-09 LAB
CARTRIDGE LOT#: 978 NUMERIC
HEMOGLOBIN A1C: 6.7 % (ref 4.3–5.6)

## 2022-08-09 PROCEDURE — 99213 OFFICE O/P EST LOW 20 MIN: CPT | Performed by: NURSE PRACTITIONER

## 2022-08-09 PROCEDURE — 95251 CONT GLUC MNTR ANALYSIS I&R: CPT | Performed by: NURSE PRACTITIONER

## 2022-08-09 PROCEDURE — 3074F SYST BP LT 130 MM HG: CPT | Performed by: NURSE PRACTITIONER

## 2022-08-09 PROCEDURE — 3078F DIAST BP <80 MM HG: CPT | Performed by: NURSE PRACTITIONER

## 2022-08-09 PROCEDURE — 83036 HEMOGLOBIN GLYCOSYLATED A1C: CPT | Performed by: NURSE PRACTITIONER

## 2022-08-09 RX ORDER — GLIPIZIDE 5 MG/1
2.5 TABLET ORAL
Qty: 45 TABLET | Refills: 1 | Status: SHIPPED | OUTPATIENT
Start: 2022-08-09

## 2022-08-09 RX ORDER — SEMAGLUTIDE 1.34 MG/ML
0.5 INJECTION, SOLUTION SUBCUTANEOUS
Qty: 4.5 ML | Refills: 1 | Status: SHIPPED | OUTPATIENT
Start: 2022-08-09 | End: 2022-08-11

## 2022-08-09 RX ORDER — FLASH GLUCOSE SENSOR
1 KIT MISCELLANEOUS
Qty: 6 EACH | Refills: 1 | Status: SHIPPED | OUTPATIENT
Start: 2022-08-09

## 2022-08-10 ENCOUNTER — TELEPHONE (OUTPATIENT)
Dept: ENDOCRINOLOGY CLINIC | Facility: CLINIC | Age: 62
End: 2022-08-10

## 2022-08-10 NOTE — TELEPHONE ENCOUNTER
Patient now being followed by [facility]- office visit yesterday with [facility], new rx for Ozempic given at that time. Faxed on to [facility], fax confirmation received.

## 2022-08-10 NOTE — TELEPHONE ENCOUNTER
8/10/22-Received via fax from [address] [place] [address] 9 E 90 day prescription for Ozempic 0.25 or 0.5 mg/dose Pen inj 2mg/1.5ml. Given to RN to review.

## 2022-08-11 DIAGNOSIS — Z79.4 TYPE 2 DIABETES MELLITUS WITH DIABETIC NEPHROPATHY, WITH LONG-TERM CURRENT USE OF INSULIN (HCC): ICD-10-CM

## 2022-08-11 DIAGNOSIS — E11.21 TYPE 2 DIABETES MELLITUS WITH DIABETIC NEPHROPATHY, WITH LONG-TERM CURRENT USE OF INSULIN (HCC): ICD-10-CM

## 2022-08-11 RX ORDER — SEMAGLUTIDE 1.34 MG/ML
0.5 INJECTION, SOLUTION SUBCUTANEOUS
Qty: 6 ML | Refills: 1 | Status: SHIPPED | OUTPATIENT
Start: 2022-08-11

## 2022-08-11 NOTE — TELEPHONE ENCOUNTER
Received fax from [employer] [facility] stating needing 90day rx. Looked at med list was sent for 4.5 mL should of been 6 mL   Pended for provider.    Please review

## 2022-08-24 ENCOUNTER — TELEPHONE (OUTPATIENT)
Dept: ENDOCRINOLOGY CLINIC | Facility: CLINIC | Age: 62
End: 2022-08-24

## 2022-08-24 NOTE — TELEPHONE ENCOUNTER
Received phone call from [phone] asking for verbal order for Ozempic. Reviewed will need to contact patient- looks like script was sent to different pharmacy- [facility] by [facility]- unsure if patient needs rx through [facility]. States will contact patient directly.

## 2022-09-02 ENCOUNTER — HOSPITAL ENCOUNTER (INPATIENT)
Facility: HOSPITAL | Age: 62
LOS: 2 days | Discharge: HOME | End: 2022-09-04
Attending: STUDENT IN AN ORGANIZED HEALTH CARE EDUCATION/TRAINING PROGRAM

## 2022-09-02 ENCOUNTER — APPOINTMENT (OUTPATIENT)
Dept: ULTRASOUND IMAGING | Facility: HOSPITAL | Age: 62
DRG: 699 | End: 2022-09-02
Attending: INTERNAL MEDICINE

## 2022-09-02 ENCOUNTER — HOSPITAL ENCOUNTER (INPATIENT)
Facility: HOSPITAL | Age: 62
LOS: 2 days | Discharge: HOME | DRG: 699 | End: 2022-09-04
Attending: STUDENT IN AN ORGANIZED HEALTH CARE EDUCATION/TRAINING PROGRAM | Admitting: INTERNAL MEDICINE

## 2022-09-02 ENCOUNTER — APPOINTMENT (OUTPATIENT)
Dept: ULTRASOUND IMAGING | Facility: HOSPITAL | Age: 62
End: 2022-09-02
Attending: INTERNAL MEDICINE

## 2022-09-02 DIAGNOSIS — N30.90 CYSTITIS: Primary | ICD-10-CM

## 2022-09-02 PROBLEM — Z94.0 RENAL TRANSPLANT RECIPIENT (HCC): Status: ACTIVE | Noted: 2018-10-30

## 2022-09-02 PROBLEM — Z94.0 RENAL TRANSPLANT RECIPIENT: Status: ACTIVE | Noted: 2018-10-30

## 2022-09-02 PROBLEM — N18.30 STAGE 3 CHRONIC KIDNEY DISEASE (HCC): Status: ACTIVE | Noted: 2022-05-24

## 2022-09-02 LAB
ALBUMIN SERPL-MCNC: 2.7 G/DL (ref 3.4–5)
ALBUMIN/GLOB SERPL: 0.6 {RATIO} (ref 1–2)
ALP LIVER SERPL-CCNC: 54 U/L
ALT SERPL-CCNC: 19 U/L
ANION GAP SERPL CALC-SCNC: 10 MMOL/L (ref 0–18)
AST SERPL-CCNC: 11 U/L (ref 15–37)
BASOPHILS # BLD AUTO: 0.04 X10(3) UL (ref 0–0.2)
BASOPHILS NFR BLD AUTO: 0.3 %
BILIRUB SERPL-MCNC: 0.7 MG/DL (ref 0.1–2)
BILIRUB UR QL STRIP.AUTO: NEGATIVE
BUN BLD-MCNC: 27 MG/DL (ref 7–18)
CALCIUM BLD-MCNC: 9.1 MG/DL (ref 8.5–10.1)
CHLORIDE SERPL-SCNC: 106 MMOL/L (ref 98–112)
CLARITY UR REFRACT.AUTO: CLEAR
CO2 SERPL-SCNC: 18 MMOL/L (ref 21–32)
COLOR UR AUTO: YELLOW
CREAT BLD-MCNC: 1.83 MG/DL
EOSINOPHIL # BLD AUTO: 0.02 X10(3) UL (ref 0–0.7)
EOSINOPHIL NFR BLD AUTO: 0.1 %
ERYTHROCYTE [DISTWIDTH] IN BLOOD BY AUTOMATED COUNT: 12.5 %
GFR SERPLBLD BASED ON 1.73 SQ M-ARVRAT: 31 ML/MIN/1.73M2 (ref 60–?)
GLOBULIN PLAS-MCNC: 4.5 G/DL (ref 2.8–4.4)
GLUCOSE BLD-MCNC: 144 MG/DL (ref 70–99)
GLUCOSE BLD-MCNC: 159 MG/DL (ref 70–99)
GLUCOSE BLD-MCNC: 174 MG/DL (ref 70–99)
GLUCOSE BLD-MCNC: 182 MG/DL (ref 70–99)
GLUCOSE UR STRIP.AUTO-MCNC: NEGATIVE MG/DL
HCT VFR BLD AUTO: 32.4 %
HGB BLD-MCNC: 10.3 G/DL
IMM GRANULOCYTES # BLD AUTO: 0.07 X10(3) UL (ref 0–1)
IMM GRANULOCYTES NFR BLD: 0.4 %
KETONES UR STRIP.AUTO-MCNC: NEGATIVE MG/DL
LACTATE SERPL-SCNC: 0.8 MMOL/L (ref 0.4–2)
LYMPHOCYTES # BLD AUTO: 1.73 X10(3) UL (ref 1–4)
LYMPHOCYTES NFR BLD AUTO: 11 %
MCH RBC QN AUTO: 28.4 PG (ref 26–34)
MCHC RBC AUTO-ENTMCNC: 31.8 G/DL (ref 31–37)
MCV RBC AUTO: 89.3 FL
MONOCYTES # BLD AUTO: 1.94 X10(3) UL (ref 0.1–1)
MONOCYTES NFR BLD AUTO: 12.3 %
NEUTROPHILS # BLD AUTO: 11.93 X10 (3) UL (ref 1.5–7.7)
NEUTROPHILS # BLD AUTO: 11.93 X10(3) UL (ref 1.5–7.7)
NEUTROPHILS NFR BLD AUTO: 75.9 %
NITRITE UR QL STRIP.AUTO: NEGATIVE
OSMOLALITY SERPL CALC.SUM OF ELEC: 286 MOSM/KG (ref 275–295)
PH UR STRIP.AUTO: 6 [PH] (ref 5–8)
PLATELET # BLD AUTO: 301 10(3)UL (ref 150–450)
POTASSIUM SERPL-SCNC: 3.6 MMOL/L (ref 3.5–5.1)
PROT SERPL-MCNC: 7.2 G/DL (ref 6.4–8.2)
PROT UR STRIP.AUTO-MCNC: NEGATIVE MG/DL
RBC # BLD AUTO: 3.63 X10(6)UL
SARS-COV-2 RNA RESP QL NAA+PROBE: NOT DETECTED
SODIUM SERPL-SCNC: 134 MMOL/L (ref 136–145)
SP GR UR STRIP.AUTO: 1.01 (ref 1–1.03)
UROBILINOGEN UR STRIP.AUTO-MCNC: 0.2 MG/DL
WBC # BLD AUTO: 15.7 X10(3) UL (ref 4–11)

## 2022-09-02 PROCEDURE — 99255 IP/OBS CONSLTJ NEW/EST HI 80: CPT | Performed by: INTERNAL MEDICINE

## 2022-09-02 PROCEDURE — 76770 US EXAM ABDO BACK WALL COMP: CPT | Performed by: INTERNAL MEDICINE

## 2022-09-02 RX ORDER — SODIUM CHLORIDE 9 MG/ML
INJECTION, SOLUTION INTRAVENOUS CONTINUOUS
Status: DISCONTINUED | OUTPATIENT
Start: 2022-09-02 | End: 2022-09-03

## 2022-09-02 RX ORDER — NICOTINE POLACRILEX 4 MG
15 LOZENGE BUCCAL
Status: DISCONTINUED | OUTPATIENT
Start: 2022-09-02 | End: 2022-09-04

## 2022-09-02 RX ORDER — HEPARIN SODIUM 5000 [USP'U]/ML
5000 INJECTION, SOLUTION INTRAVENOUS; SUBCUTANEOUS EVERY 8 HOURS SCHEDULED
Status: DISCONTINUED | OUTPATIENT
Start: 2022-09-02 | End: 2022-09-04

## 2022-09-02 RX ORDER — ONDANSETRON 2 MG/ML
4 INJECTION INTRAMUSCULAR; INTRAVENOUS EVERY 6 HOURS PRN
Status: DISCONTINUED | OUTPATIENT
Start: 2022-09-02 | End: 2022-09-04

## 2022-09-02 RX ORDER — PREDNISONE 2.5 MG
2.5 TABLET ORAL DAILY
Status: DISCONTINUED | OUTPATIENT
Start: 2022-09-03 | End: 2022-09-04

## 2022-09-02 RX ORDER — DEXTROSE MONOHYDRATE 25 G/50ML
50 INJECTION, SOLUTION INTRAVENOUS
Status: DISCONTINUED | OUTPATIENT
Start: 2022-09-02 | End: 2022-09-04

## 2022-09-02 RX ORDER — ACETAMINOPHEN 500 MG
500 TABLET ORAL EVERY 4 HOURS PRN
Status: DISCONTINUED | OUTPATIENT
Start: 2022-09-02 | End: 2022-09-04

## 2022-09-02 RX ORDER — NICOTINE POLACRILEX 4 MG
30 LOZENGE BUCCAL
Status: DISCONTINUED | OUTPATIENT
Start: 2022-09-02 | End: 2022-09-04

## 2022-09-02 RX ORDER — CEFDINIR 300 MG/1
300 CAPSULE ORAL 2 TIMES DAILY
COMMUNITY
Start: 2022-09-02 | End: 2022-09-12

## 2022-09-02 RX ORDER — MYCOPHENOLATE MOFETIL 250 MG/1
250 CAPSULE ORAL
Status: DISCONTINUED | OUTPATIENT
Start: 2022-09-02 | End: 2022-09-02

## 2022-09-02 RX ORDER — TACROLIMUS 1 MG/1
1 CAPSULE ORAL 2 TIMES DAILY
Status: DISCONTINUED | OUTPATIENT
Start: 2022-09-02 | End: 2022-09-04

## 2022-09-02 RX ORDER — PROCHLORPERAZINE EDISYLATE 5 MG/ML
5 INJECTION INTRAMUSCULAR; INTRAVENOUS EVERY 8 HOURS PRN
Status: DISCONTINUED | OUTPATIENT
Start: 2022-09-02 | End: 2022-09-04

## 2022-09-02 RX ORDER — ATORVASTATIN CALCIUM 40 MG/1
40 TABLET, FILM COATED ORAL NIGHTLY
Status: DISCONTINUED | OUTPATIENT
Start: 2022-09-02 | End: 2022-09-04

## 2022-09-02 RX ORDER — ACETAMINOPHEN 500 MG
1000 TABLET ORAL ONCE
Status: COMPLETED | OUTPATIENT
Start: 2022-09-02 | End: 2022-09-02

## 2022-09-02 RX ORDER — MYCOPHENOLATE MOFETIL 250 MG/1
250 CAPSULE ORAL 2 TIMES DAILY
Status: DISCONTINUED | OUTPATIENT
Start: 2022-09-03 | End: 2022-09-04

## 2022-09-02 NOTE — ED INITIAL ASSESSMENT (HPI)
PT states that she is visiting the E.D. after being assessed at an immediate care yesterday due to persistent fever. PT states that she was diagnosed with an UTI yesterday ans she was prescribed antibiotics (one dose taken). PT states that she took Tylenol around midnight but that the fever does not go away.  PT states that she is concerned as she had a kidney transplant on 2018

## 2022-09-02 NOTE — PLAN OF CARE
NURSING ADMISSION NOTE      Patient admitted via cart  Oriented to room. Safety precautions initiated. Bed in low position. Call light in reach. Admission database completed. Kidney ultrasound done results pending.

## 2022-09-02 NOTE — ED QUICK NOTES
Orders for admission, patient is aware of plan and ready to go upstairs.  Any questions, please call ED RN Jami Horton at extension [phone]     Patient Covid vaccination status: Fully vaccinated and immunocompromised     COVID Test Ordered in ED: None    COVID Suspicion at Admission: N/A    Running Infusions:  None    Mental Status/LOC at time of transport: Alertt    Other pertinent information:   CIWA score: N/A   NIH score:  N/A

## 2022-09-03 LAB
ANION GAP SERPL CALC-SCNC: 8 MMOL/L (ref 0–18)
BASOPHILS # BLD AUTO: 0.04 X10(3) UL (ref 0–0.2)
BASOPHILS NFR BLD AUTO: 0.5 %
BUN BLD-MCNC: 21 MG/DL (ref 7–18)
CALCIUM BLD-MCNC: 9.2 MG/DL (ref 8.5–10.1)
CHLORIDE SERPL-SCNC: 116 MMOL/L (ref 98–112)
CO2 SERPL-SCNC: 17 MMOL/L (ref 21–32)
CREAT BLD-MCNC: 1.29 MG/DL
EOSINOPHIL # BLD AUTO: 0.04 X10(3) UL (ref 0–0.7)
EOSINOPHIL NFR BLD AUTO: 0.5 %
ERYTHROCYTE [DISTWIDTH] IN BLOOD BY AUTOMATED COUNT: 12.8 %
GFR SERPLBLD BASED ON 1.73 SQ M-ARVRAT: 47 ML/MIN/1.73M2 (ref 60–?)
GLUCOSE BLD-MCNC: 105 MG/DL (ref 70–99)
GLUCOSE BLD-MCNC: 111 MG/DL (ref 70–99)
GLUCOSE BLD-MCNC: 132 MG/DL (ref 70–99)
GLUCOSE BLD-MCNC: 149 MG/DL (ref 70–99)
GLUCOSE BLD-MCNC: 196 MG/DL (ref 70–99)
HCT VFR BLD AUTO: 31.8 %
HGB BLD-MCNC: 9.7 G/DL
IMM GRANULOCYTES # BLD AUTO: 0.03 X10(3) UL (ref 0–1)
IMM GRANULOCYTES NFR BLD: 0.4 %
LYMPHOCYTES # BLD AUTO: 1.13 X10(3) UL (ref 1–4)
LYMPHOCYTES NFR BLD AUTO: 13.9 %
MCH RBC QN AUTO: 28.6 PG (ref 26–34)
MCHC RBC AUTO-ENTMCNC: 30.5 G/DL (ref 31–37)
MCV RBC AUTO: 93.8 FL
MONOCYTES # BLD AUTO: 1.09 X10(3) UL (ref 0.1–1)
MONOCYTES NFR BLD AUTO: 13.4 %
NEUTROPHILS # BLD AUTO: 5.78 X10 (3) UL (ref 1.5–7.7)
NEUTROPHILS # BLD AUTO: 5.78 X10(3) UL (ref 1.5–7.7)
NEUTROPHILS NFR BLD AUTO: 71.3 %
OSMOLALITY SERPL CALC.SUM OF ELEC: 296 MOSM/KG (ref 275–295)
PLATELET # BLD AUTO: 282 10(3)UL (ref 150–450)
POTASSIUM SERPL-SCNC: 3.8 MMOL/L (ref 3.5–5.1)
RBC # BLD AUTO: 3.39 X10(6)UL
SODIUM SERPL-SCNC: 141 MMOL/L (ref 136–145)
WBC # BLD AUTO: 8.1 X10(3) UL (ref 4–11)

## 2022-09-03 PROCEDURE — 99233 SBSQ HOSP IP/OBS HIGH 50: CPT | Performed by: INTERNAL MEDICINE

## 2022-09-03 NOTE — PLAN OF CARE
Received patient, alert and oriented. Denied chest pain, denied SOB. Up with steady gait. Discussed POC. Due meds given. Safety measures reinforced, call light within reach. Needs attended to. Will cotninue to monitor. Problem: Patient/Family Goals  Goal: Patient/Family Long Term Goal  Description: Patient's Long Term Goal: DC Home    Interventions:  - Monitor VS, tele, labs and test results  - Meds as ordered  - Seen by Consults  - See additional Care Plan goals for specific interventions  Outcome: Progressing  Goal: Patient/Family Short Term Goal  Description: Patient's Short Term Goal: Afberile    Interventions:   - Monitor VS, tele, labs and test results  - Meds as ordered  - Seen by Consults  - See additional Care Plan goals for specific interventions  Outcome: Progressing     Problem: CARDIOVASCULAR - ADULT  Goal: Maintains optimal cardiac output and hemodynamic stability  Description: INTERVENTIONS:  - Monitor vital signs, rhythm, and trends  - Monitor for bleeding, hypotension and signs of decreased cardiac output  - Evaluate effectiveness of vasoactive medications to optimize hemodynamic stability  - Monitor arterial and/or venous puncture sites for bleeding and/or hematoma  - Assess quality of pulses, skin color and temperature  - Assess for signs of decreased coronary artery perfusion - ex.  Angina  - Evaluate fluid balance, assess for edema, trend weights  Outcome: Progressing  Goal: Absence of cardiac arrhythmias or at baseline  Description: INTERVENTIONS:  - Continuous cardiac monitoring, monitor vital signs, obtain 12 lead EKG if indicated  - Evaluate effectiveness of antiarrhythmic and heart rate control medications as ordered  - Initiate emergency measures for life threatening arrhythmias  - Monitor electrolytes and administer replacement therapy as ordered  Outcome: Progressing

## 2022-09-03 NOTE — PLAN OF CARE
Assumed care at 0730. Pt is A&Ox4. Pt is on RA, sats maintaining >90%, lungs clear. NSR on tele, VSS. No complaints of cardiac symptoms. Continent of B&B. Denies pain at this time. Up w/ SBA, tolerating well. Plan of care reviewed with patient, verbalizes understanding, all needs addressed at this time, pt seems to be resting comfortably. Problem: Patient/Family Goals  Goal: Patient/Family Long Term Goal  Description: Patient's Long Term Goal: to go home    Interventions:  - continue antibiotics to relieve UTI symptoms  - rest, hydrate  - See additional Care Plan goals for specific interventions  Outcome: Progressing  Goal: Patient/Family Short Term Goal  Description: Patient's Short Term Goal: relieve UTI symptoms    Interventions:   - continue atbx  - rest  --hydrate  - See additional Care Plan goals for specific interventions  Outcome: Progressing     Problem: CARDIOVASCULAR - ADULT  Goal: Maintains optimal cardiac output and hemodynamic stability  Description: INTERVENTIONS:  - Monitor vital signs, rhythm, and trends  - Monitor for bleeding, hypotension and signs of decreased cardiac output  - Evaluate effectiveness of vasoactive medications to optimize hemodynamic stability  - Monitor arterial and/or venous puncture sites for bleeding and/or hematoma  - Assess quality of pulses, skin color and temperature  - Assess for signs of decreased coronary artery perfusion - ex.  Angina  - Evaluate fluid balance, assess for edema, trend weights  Outcome: Progressing  Goal: Absence of cardiac arrhythmias or at baseline  Description: INTERVENTIONS:  - Continuous cardiac monitoring, monitor vital signs, obtain 12 lead EKG if indicated  - Evaluate effectiveness of antiarrhythmic and heart rate control medications as ordered  - Initiate emergency measures for life threatening arrhythmias  - Monitor electrolytes and administer replacement therapy as ordered  Outcome: Progressing

## 2022-09-03 NOTE — PROGRESS NOTES
Assumed care of pt. At 1500. Denies c/o pain, yet wild shivers and rigors from low grade fever. Gave her Tylenol and warm blankets to help her. After an hour her fever was 99.4 taken down. Lungs clear on RA. Abdomen soft and non tender to touch with active bowel sounds in all four quadrants. Tolerating all medications well with no adverse effects. Remains in NSR, NL S1, S2. All needs met by staff. Will continue to monitor.

## 2022-09-04 VITALS
WEIGHT: 147.25 LBS | DIASTOLIC BLOOD PRESSURE: 73 MMHG | TEMPERATURE: 98 F | BODY MASS INDEX: 27.8 KG/M2 | SYSTOLIC BLOOD PRESSURE: 129 MMHG | HEART RATE: 71 BPM | HEIGHT: 61 IN | OXYGEN SATURATION: 93 % | RESPIRATION RATE: 18 BRPM

## 2022-09-04 LAB
ANION GAP SERPL CALC-SCNC: 5 MMOL/L (ref 0–18)
BASOPHILS # BLD AUTO: 0.04 X10(3) UL (ref 0–0.2)
BASOPHILS NFR BLD AUTO: 0.8 %
BUN BLD-MCNC: 23 MG/DL (ref 7–18)
CALCIUM BLD-MCNC: 10.3 MG/DL (ref 8.5–10.1)
CHLORIDE SERPL-SCNC: 115 MMOL/L (ref 98–112)
CO2 SERPL-SCNC: 20 MMOL/L (ref 21–32)
CREAT BLD-MCNC: 1.3 MG/DL
EOSINOPHIL # BLD AUTO: 0.11 X10(3) UL (ref 0–0.7)
EOSINOPHIL NFR BLD AUTO: 2.1 %
ERYTHROCYTE [DISTWIDTH] IN BLOOD BY AUTOMATED COUNT: 12.7 %
GFR SERPLBLD BASED ON 1.73 SQ M-ARVRAT: 46 ML/MIN/1.73M2 (ref 60–?)
GLUCOSE BLD-MCNC: 101 MG/DL (ref 70–99)
GLUCOSE BLD-MCNC: 104 MG/DL (ref 70–99)
HCT VFR BLD AUTO: 33.4 %
HGB BLD-MCNC: 10.3 G/DL
IMM GRANULOCYTES # BLD AUTO: 0.02 X10(3) UL (ref 0–1)
IMM GRANULOCYTES NFR BLD: 0.4 %
LYMPHOCYTES # BLD AUTO: 1.38 X10(3) UL (ref 1–4)
LYMPHOCYTES NFR BLD AUTO: 26.1 %
MCH RBC QN AUTO: 28.5 PG (ref 26–34)
MCHC RBC AUTO-ENTMCNC: 30.8 G/DL (ref 31–37)
MCV RBC AUTO: 92.5 FL
MONOCYTES # BLD AUTO: 0.82 X10(3) UL (ref 0.1–1)
MONOCYTES NFR BLD AUTO: 15.5 %
NEUTROPHILS # BLD AUTO: 2.92 X10 (3) UL (ref 1.5–7.7)
NEUTROPHILS # BLD AUTO: 2.92 X10(3) UL (ref 1.5–7.7)
NEUTROPHILS NFR BLD AUTO: 55.1 %
OSMOLALITY SERPL CALC.SUM OF ELEC: 294 MOSM/KG (ref 275–295)
PLATELET # BLD AUTO: 342 10(3)UL (ref 150–450)
POTASSIUM SERPL-SCNC: 4 MMOL/L (ref 3.5–5.1)
RBC # BLD AUTO: 3.61 X10(6)UL
SODIUM SERPL-SCNC: 140 MMOL/L (ref 136–145)
WBC # BLD AUTO: 5.3 X10(3) UL (ref 4–11)

## 2022-09-04 NOTE — PLAN OF CARE
Received patient at 0730 . Alert and oriented x4. Tele rhythm normal sinus. O2 saturation 96% on room air. Breath sounds are clear. Patient is ambulating with no assistance throughout room. Patient is voiding. Pt finished last does of IV Antibiotic. Cleared for DC by all consults. Problem: Patient/Family Goals  Goal: Patient/Family Long Term Goal  Description: Patient's Long Term Goal: to go home    Interventions:  - continue antibiotics to relieve UTI symptoms  - rest, hydrate  - See additional Care Plan goals for specific interventions  Outcome: Completed  Goal: Patient/Family Short Term Goal  Description: Patient's Short Term Goal: relieve UTI symptoms    Interventions:   - continue atbx  - rest  --hydrate  - See additional Care Plan goals for specific interventions  Outcome: Completed     Problem: CARDIOVASCULAR - ADULT  Goal: Maintains optimal cardiac output and hemodynamic stability  Description: INTERVENTIONS:  - Monitor vital signs, rhythm, and trends  - Monitor for bleeding, hypotension and signs of decreased cardiac output  - Evaluate effectiveness of vasoactive medications to optimize hemodynamic stability  - Monitor arterial and/or venous puncture sites for bleeding and/or hematoma  - Assess quality of pulses, skin color and temperature  - Assess for signs of decreased coronary artery perfusion - ex.  Angina  - Evaluate fluid balance, assess for edema, trend weights  Outcome: Completed  Goal: Absence of cardiac arrhythmias or at baseline  Description: INTERVENTIONS:  - Continuous cardiac monitoring, monitor vital signs, obtain 12 lead EKG if indicated  - Evaluate effectiveness of antiarrhythmic and heart rate control medications as ordered  - Initiate emergency measures for life threatening arrhythmias  - Monitor electrolytes and administer replacement therapy as ordered  Outcome: Completed

## 2022-09-04 NOTE — PROGRESS NOTES
Explained discharge instructions including mediations and follow ups to the patient, verbalizes understanding. IV removed, catheter intact. Tele monitor discontinued. Escorted via wheelchair to [facility].

## 2022-09-04 NOTE — PLAN OF CARE
Received patient, alert and oriented. Denied chest pain, denied SOB. Up ad allen. Claimed she felt better. Discussed POC. Due meds given. Safety measures reinforced, call light within reach. Needs attended to. Will continue to monitor. Problem: Patient/Family Goals  Goal: Patient/Family Long Term Goal  Description: Patient's Long Term Goal: to go home    Interventions:  - continue antibiotics to relieve UTI symptoms  - rest, hydrate  - See additional Care Plan goals for specific interventions  Outcome: Progressing  Goal: Patient/Family Short Term Goal  Description: Patient's Short Term Goal: relieve UTI symptoms    Interventions:   - continue atbx  - rest  --hydrate  - See additional Care Plan goals for specific interventions  Outcome: Progressing     Problem: CARDIOVASCULAR - ADULT  Goal: Maintains optimal cardiac output and hemodynamic stability  Description: INTERVENTIONS:  - Monitor vital signs, rhythm, and trends  - Monitor for bleeding, hypotension and signs of decreased cardiac output  - Evaluate effectiveness of vasoactive medications to optimize hemodynamic stability  - Monitor arterial and/or venous puncture sites for bleeding and/or hematoma  - Assess quality of pulses, skin color and temperature  - Assess for signs of decreased coronary artery perfusion - ex.  Angina  - Evaluate fluid balance, assess for edema, trend weights  Outcome: Progressing  Goal: Absence of cardiac arrhythmias or at baseline  Description: INTERVENTIONS:  - Continuous cardiac monitoring, monitor vital signs, obtain 12 lead EKG if indicated  - Evaluate effectiveness of antiarrhythmic and heart rate control medications as ordered  - Initiate emergency measures for life threatening arrhythmias  - Monitor electrolytes and administer replacement therapy as ordered  Outcome: Progressing

## 2022-09-11 DIAGNOSIS — E11.21 TYPE 2 DIABETES MELLITUS WITH DIABETIC NEPHROPATHY, WITH LONG-TERM CURRENT USE OF INSULIN (HCC): ICD-10-CM

## 2022-09-11 DIAGNOSIS — Z79.4 TYPE 2 DIABETES MELLITUS WITH DIABETIC NEPHROPATHY, WITH LONG-TERM CURRENT USE OF INSULIN (HCC): ICD-10-CM

## 2022-09-12 RX ORDER — SEMAGLUTIDE 1.34 MG/ML
0.5 INJECTION, SOLUTION SUBCUTANEOUS
Qty: 6 ML | Refills: 1 | Status: SHIPPED | OUTPATIENT
Start: 2022-09-12

## 2022-09-12 NOTE — TELEPHONE ENCOUNTER
LOV- 6/8/22    Future Appointments   Date Time Provider Davide Laine   10/31/2022  3:00 PM Micah Yepez MD [facility] [place]   12/27/2022  9:00 AM SOY Crespo [facility] [address]

## 2022-09-26 NOTE — TELEPHONE ENCOUNTER
LOV-6/8/22    Future Appointments   Date Time Provider Davide Laine   10/31/2022  3:00 PM Danny Acosta MD [facility] [place]   12/27/2022  9:00 AM SOY Mireles [facility] [address]

## 2022-09-27 RX ORDER — INSULIN GLARGINE 100 [IU]/ML
8 INJECTION, SOLUTION SUBCUTANEOUS NIGHTLY
Qty: 3 EACH | Refills: 0 | Status: SHIPPED | OUTPATIENT
Start: 2022-09-27

## 2022-09-27 NOTE — TELEPHONE ENCOUNTER
Spoke with patient on telephone, states since her visit with [facility] at [facility]- she has cut lantus down to 8u nightly. Rx pended.

## 2022-10-31 ENCOUNTER — OFFICE VISIT (OUTPATIENT)
Dept: ENDOCRINOLOGY CLINIC | Facility: CLINIC | Age: 62
End: 2022-10-31
Payer: COMMERCIAL

## 2022-10-31 VITALS — SYSTOLIC BLOOD PRESSURE: 115 MMHG | HEART RATE: 99 BPM | DIASTOLIC BLOOD PRESSURE: 72 MMHG

## 2022-10-31 DIAGNOSIS — Z79.4 TYPE 2 DIABETES MELLITUS WITH DIABETIC NEPHROPATHY, WITH LONG-TERM CURRENT USE OF INSULIN (HCC): Primary | ICD-10-CM

## 2022-10-31 DIAGNOSIS — E11.21 TYPE 2 DIABETES MELLITUS WITH DIABETIC NEPHROPATHY, WITH LONG-TERM CURRENT USE OF INSULIN (HCC): Primary | ICD-10-CM

## 2022-10-31 RX ORDER — INSULIN GLARGINE 100 [IU]/ML
8 INJECTION, SOLUTION SUBCUTANEOUS NIGHTLY
Qty: 3 EACH | Refills: 0 | Status: SHIPPED | OUTPATIENT
Start: 2022-10-31

## 2022-10-31 RX ORDER — BLOOD-GLUCOSE SENSOR
1 EACH MISCELLANEOUS CONTINUOUS
Qty: 6 EACH | Refills: 0 | Status: SHIPPED | OUTPATIENT
Start: 2022-10-31

## 2022-10-31 RX ORDER — SEMAGLUTIDE 1.34 MG/ML
1 INJECTION, SOLUTION SUBCUTANEOUS
Qty: 6 ML | Refills: 1 | Status: SHIPPED | OUTPATIENT
Start: 2022-10-31

## 2022-10-31 RX ORDER — PEN NEEDLE, DIABETIC 30 GX3/16"
1 NEEDLE, DISPOSABLE MISCELLANEOUS
Qty: 30 EACH | Refills: 1 | Status: SHIPPED | OUTPATIENT
Start: 2022-10-31

## 2022-11-04 ENCOUNTER — MED REC SCAN ONLY (OUTPATIENT)
Dept: ENDOCRINOLOGY CLINIC | Facility: CLINIC | Age: 62
End: 2022-11-04

## 2022-11-08 ENCOUNTER — TELEPHONE (OUTPATIENT)
Dept: ENDOCRINOLOGY CLINIC | Facility: CLINIC | Age: 62
End: 2022-11-08

## 2022-11-08 NOTE — TELEPHONE ENCOUNTER
Patient phoned in, states her Lantus is not being covered by insurance per pharmacy, need alternative. Phoned pharmacy, they had office fax number wrong, confirmed our fax, received notification that Lantus not covered: Semglee preferred alternative.

## 2022-11-09 RX ORDER — INSULIN DEGLUDEC INJECTION 100 U/ML
8 INJECTION, SOLUTION SUBCUTANEOUS DAILY
Qty: 3 ML | Refills: 0 | Status: SHIPPED | OUTPATIENT
Start: 2022-11-09

## 2022-11-09 NOTE — TELEPHONE ENCOUNTER
Dr. Lis Martin reviewed and in place ordered Tresiba. Phoned [facility], confirmed Ukchristophine went through insurance with no copay. Phoned patient and updated on change made, verbalized understanding.

## 2022-11-22 RX ORDER — GLIPIZIDE 5 MG/1
TABLET ORAL
Qty: 90 TABLET | Refills: 1 | Status: SHIPPED | OUTPATIENT
Start: 2022-11-22

## 2022-11-22 RX ORDER — GLIPIZIDE 5 MG/1
2.5 TABLET ORAL
Qty: 45 TABLET | Refills: 0 | Status: SHIPPED | OUTPATIENT
Start: 2022-11-22 | End: 2022-11-22

## 2022-11-22 NOTE — TELEPHONE ENCOUNTER
Spoke with pt via phone. Verified name and . Explained ot pt that Dr. Kade Art ordered Glipizide 2.5 mg BID. Pt has been on Ozempic 1mg weekly for 2 weeks now. She is having some stomach upset. I requested that she let us know in 2 weeks after she's been on the 1mg dose for one month how she is feeling. I no side effcts, Dr. Kade Art will increase Ozempic to 2 mg and have her stop Gliizide. Pt verbalized understanding. No further questions.

## 2022-11-22 NOTE — TELEPHONE ENCOUNTER
Pt called. Verified name and . Pt states she is having issues with high blood sugars and wants to ask Dr. Mayra Lester about going back on Glipizide. 2.5 mg daily. Pt states her glucose readings are always around 160 and after eating goes up as high as 305. Pt currently taking Ozempic 1mg weekly and Metformin 500mg BID. RX pended for signature if Dr. Mayra Lester approves.

## 2022-12-27 DIAGNOSIS — Z79.4 TYPE 2 DIABETES MELLITUS WITH DIABETIC NEPHROPATHY, WITH LONG-TERM CURRENT USE OF INSULIN (HCC): ICD-10-CM

## 2022-12-27 DIAGNOSIS — E11.21 TYPE 2 DIABETES MELLITUS WITH DIABETIC NEPHROPATHY, WITH LONG-TERM CURRENT USE OF INSULIN (HCC): ICD-10-CM

## 2022-12-27 RX ORDER — FLASH GLUCOSE SENSOR
1 KIT MISCELLANEOUS
Qty: 6 EACH | Refills: 1 | Status: SHIPPED | OUTPATIENT
Start: 2022-12-27

## 2022-12-27 NOTE — TELEPHONE ENCOUNTER
Spoke with patient on telephone, states she has had two Amy sensors fall off early, and now she has no more sensors to apply at home. Shamar Gomez Customer Service phone number: [phone]. Reviewed to call and explain what happened, can usually send coupon for sensor.     Will also send refill request to Dr. Josselin Garcia.

## 2023-01-10 RX ORDER — GLIPIZIDE 5 MG/1
2.5 TABLET ORAL 2 TIMES DAILY
Qty: 90 TABLET | Refills: 1 | Status: SHIPPED | OUTPATIENT
Start: 2023-01-10

## 2023-01-10 RX ORDER — SEMAGLUTIDE 1.34 MG/ML
1 INJECTION, SOLUTION SUBCUTANEOUS
Qty: 6 ML | Refills: 1 | Status: SHIPPED | OUTPATIENT
Start: 2023-01-10

## 2023-01-10 RX ORDER — INSULIN DEGLUDEC INJECTION 100 U/ML
8 INJECTION, SOLUTION SUBCUTANEOUS DAILY
Qty: 3 ML | Refills: 0 | Status: SHIPPED | OUTPATIENT
Start: 2023-01-10

## 2023-01-10 NOTE — TELEPHONE ENCOUNTER
Pt phoned office. Verified name and . Pt states that she has anew insurance and needs 4 refills of her medications to go to the [place] [facility] [facility] [address] in [place]. Orders pended for signature.    LOV-10/31    Future Appointments   Date Time Provider Davide Jang   2023  2:40 PM Erika Chin MD [facility] DARIEL[facility] [place]

## 2023-01-31 ENCOUNTER — OFFICE VISIT (OUTPATIENT)
Facility: CLINIC | Age: 63
End: 2023-01-31
Payer: COMMERCIAL

## 2023-01-31 VITALS — BODY MASS INDEX: 27 KG/M2 | WEIGHT: 144 LBS

## 2023-01-31 DIAGNOSIS — E11.21 TYPE 2 DIABETES MELLITUS WITH DIABETIC NEPHROPATHY, WITH LONG-TERM CURRENT USE OF INSULIN (HCC): Primary | ICD-10-CM

## 2023-01-31 DIAGNOSIS — Z79.4 TYPE 2 DIABETES MELLITUS WITH DIABETIC NEPHROPATHY, WITH LONG-TERM CURRENT USE OF INSULIN (HCC): Primary | ICD-10-CM

## 2023-01-31 LAB
CARTRIDGE LOT#: ABNORMAL NUMERIC
GLUCOSE BLOOD: 137
HEMOGLOBIN A1C: 7 % (ref 4.3–5.6)
TEST STRIP LOT #: NORMAL NUMERIC

## 2023-01-31 RX ORDER — SEMAGLUTIDE 2.68 MG/ML
2 INJECTION, SOLUTION SUBCUTANEOUS WEEKLY
Qty: 12 EACH | Refills: 1 | Status: SHIPPED | OUTPATIENT
Start: 2023-01-31 | End: 2023-04-19

## 2023-01-31 RX ORDER — BLOOD-GLUCOSE SENSOR
1 EACH MISCELLANEOUS
Qty: 6 EACH | Refills: 1 | Status: SHIPPED | OUTPATIENT
Start: 2023-01-31

## 2023-02-03 ENCOUNTER — MED REC SCAN ONLY (OUTPATIENT)
Dept: ENDOCRINOLOGY CLINIC | Facility: CLINIC | Age: 63
End: 2023-02-03

## 2023-02-06 RX ORDER — INSULIN DEGLUDEC INJECTION 100 U/ML
INJECTION, SOLUTION SUBCUTANEOUS
Qty: 3 ML | Refills: 0 | Status: SHIPPED | OUTPATIENT
Start: 2023-02-06

## 2023-03-13 RX ORDER — INSULIN DEGLUDEC INJECTION 100 U/ML
8 INJECTION, SOLUTION SUBCUTANEOUS DAILY
Qty: 3 ML | Refills: 0 | Status: SHIPPED | OUTPATIENT
Start: 2023-03-13

## 2023-03-13 NOTE — TELEPHONE ENCOUNTER
3/10/23-Received via fax from [address] a refill request for [place] Flextouch Pen (U-100) [id number]. Given to RN to review.

## 2023-03-13 NOTE — TELEPHONE ENCOUNTER
Patient replied confirming she would like only [facility] going to this [facility]. Medication pended. Routed to Dr. Amy Frank.     LOV: 23     FU: scheduled 23    Last Refill: 23    Month Supply Pendin month

## 2023-03-13 NOTE — TELEPHONE ENCOUNTER
Refill request is for a different  Pharmacy from what we have listed.  LVM and sent MyChart message to pt to clarify

## 2023-04-17 NOTE — TELEPHONE ENCOUNTER
Pharmacy requesting refill. Medication- Gib Laughter Flextouch pen 100u/ml  patient takes 8u (0.08ml) daily.     Last office visit- 1/31/23    Return to clinic-5/1/23    Last prescribed- 3/13/23    Number of refills-0

## 2023-04-18 RX ORDER — INSULIN DEGLUDEC INJECTION 100 U/ML
INJECTION, SOLUTION SUBCUTANEOUS
Qty: 3 ML | Refills: 0 | Status: SHIPPED | OUTPATIENT
Start: 2023-04-18

## 2023-04-28 RX ORDER — SEMAGLUTIDE 2.68 MG/ML
2 INJECTION, SOLUTION SUBCUTANEOUS WEEKLY
Qty: 9 ML | Refills: 1 | Status: SHIPPED | OUTPATIENT
Start: 2023-04-28

## 2023-04-28 NOTE — TELEPHONE ENCOUNTER
Pt phoned into clinic requesting a refill of their Ozempic 2MG/Dose. Confirmed pts preferred pharmacy is [facility], Jes Vera requesting to have this sent over ASA, informed pt Dr. Bi Israel is out of office until Monday but would send to [facility] for review. Pended refill.    LOV: 1/31/23  RTC: 5/1/23

## 2023-05-01 ENCOUNTER — OFFICE VISIT (OUTPATIENT)
Facility: CLINIC | Age: 63
End: 2023-05-01
Payer: COMMERCIAL

## 2023-05-01 VITALS — SYSTOLIC BLOOD PRESSURE: 124 MMHG | DIASTOLIC BLOOD PRESSURE: 70 MMHG | OXYGEN SATURATION: 98 % | HEART RATE: 79 BPM

## 2023-05-01 DIAGNOSIS — Z79.4 TYPE 2 DIABETES MELLITUS WITH DIABETIC NEPHROPATHY, WITH LONG-TERM CURRENT USE OF INSULIN (HCC): Primary | ICD-10-CM

## 2023-05-01 DIAGNOSIS — E11.21 TYPE 2 DIABETES MELLITUS WITH DIABETIC NEPHROPATHY, WITH LONG-TERM CURRENT USE OF INSULIN (HCC): Primary | ICD-10-CM

## 2023-05-01 DIAGNOSIS — Z94.0 S/P KIDNEY TRANSPLANT: ICD-10-CM

## 2023-05-01 DIAGNOSIS — N18.32 STAGE 3B CHRONIC KIDNEY DISEASE (HCC): ICD-10-CM

## 2023-05-01 PROCEDURE — 3078F DIAST BP <80 MM HG: CPT | Performed by: STUDENT IN AN ORGANIZED HEALTH CARE EDUCATION/TRAINING PROGRAM

## 2023-05-01 PROCEDURE — 3074F SYST BP LT 130 MM HG: CPT | Performed by: STUDENT IN AN ORGANIZED HEALTH CARE EDUCATION/TRAINING PROGRAM

## 2023-05-01 PROCEDURE — 95251 CONT GLUC MNTR ANALYSIS I&R: CPT | Performed by: STUDENT IN AN ORGANIZED HEALTH CARE EDUCATION/TRAINING PROGRAM

## 2023-05-01 PROCEDURE — 99214 OFFICE O/P EST MOD 30 MIN: CPT | Performed by: STUDENT IN AN ORGANIZED HEALTH CARE EDUCATION/TRAINING PROGRAM

## 2023-05-02 ENCOUNTER — TELEPHONE (OUTPATIENT)
Facility: CLINIC | Age: 63
End: 2023-05-02

## 2023-05-02 NOTE — TELEPHONE ENCOUNTER
At office visit yesterday, 5/1, patient asking about cost saving help with Ozempic. Preference is to stick with Ozempic- they do not want to change medication because it is working so well for patient. Sent [employer] message with [employer] PAP information. Asked them to review to see if they meet eligibility requirements- if so, we can fill out application.

## 2023-05-05 NOTE — TELEPHONE ENCOUNTER
Spoke with patient's  on telephone. States when he went to pharmacy he was able to  Ozempic 3 month supply using  site coupon card- cost of only $80.     Possible that deductible has been met and now being covered at [place]? He will look into PAP, but states no other need right now. Will follow up with us in the future if any problem. Will close this encounter.

## 2023-06-23 NOTE — TELEPHONE ENCOUNTER
:PV 5/1/23    Future Appointments   Date Time Provider Davide Jang   10/2/2023  2:40 PM Delta Acuna MD [facility] [facility] [place] dose decreased to 7 units daily on 5/1 with FBG goal .

## 2023-06-26 RX ORDER — INSULIN DEGLUDEC INJECTION 100 U/ML
7 INJECTION, SOLUTION SUBCUTANEOUS DAILY
Qty: 3 ML | Refills: 0 | Status: SHIPPED | OUTPATIENT
Start: 2023-06-26

## 2023-06-26 NOTE — TELEPHONE ENCOUNTER
Received repeat refill request.  Pt next visit scheduled 10/02/23. pt last office visit 05/01/23. Notes from last visit state: - lower Tresiba 8U daily--> 7U daily, FBG goal    RX is pended and routed to provider.

## 2023-08-01 RX ORDER — INSULIN DEGLUDEC INJECTION 100 U/ML
7 INJECTION, SOLUTION SUBCUTANEOUS DAILY
Qty: 6 ML | Refills: 1 | Status: SHIPPED | OUTPATIENT
Start: 2023-08-01

## 2023-08-22 ENCOUNTER — TELEPHONE (OUTPATIENT)
Facility: CLINIC | Age: 63
End: 2023-08-22

## 2023-08-22 NOTE — TELEPHONE ENCOUNTER
Received fax PA request from [address] for Ozempic 2mg/dose pen. Started PA in Cover My Meds using key: [id number]    Will await determination.

## 2023-08-23 NOTE — TELEPHONE ENCOUNTER
Received fax stating approval for Ozempic 8mg/3ml pen, 2mg/dose. Case Certification #: [id number]. Approved 8/23/23-8/23/24. Phoned patient's pharmacy and confirmed the rx is going through ok- states just \"too soon\" for fill. Approval sent to scanning. Closing this encounter.

## 2023-08-30 ENCOUNTER — MED REC SCAN ONLY (OUTPATIENT)
Facility: CLINIC | Age: 63
End: 2023-08-30

## 2023-09-13 DIAGNOSIS — Z79.4 TYPE 2 DIABETES MELLITUS WITH DIABETIC NEPHROPATHY, WITH LONG-TERM CURRENT USE OF INSULIN (HCC): Primary | ICD-10-CM

## 2023-09-13 DIAGNOSIS — E11.21 TYPE 2 DIABETES MELLITUS WITH DIABETIC NEPHROPATHY, WITH LONG-TERM CURRENT USE OF INSULIN (HCC): Primary | ICD-10-CM

## 2023-09-14 RX ORDER — BLOOD-GLUCOSE SENSOR
1 EACH MISCELLANEOUS
Qty: 6 EACH | Refills: 1 | Status: SHIPPED | OUTPATIENT
Start: 2023-09-14

## 2023-10-02 ENCOUNTER — OFFICE VISIT (OUTPATIENT)
Facility: CLINIC | Age: 63
End: 2023-10-02
Payer: COMMERCIAL

## 2023-10-02 VITALS
HEIGHT: 61 IN | HEART RATE: 77 BPM | BODY MASS INDEX: 26.4 KG/M2 | WEIGHT: 139.81 LBS | OXYGEN SATURATION: 98 % | DIASTOLIC BLOOD PRESSURE: 68 MMHG | SYSTOLIC BLOOD PRESSURE: 114 MMHG

## 2023-10-02 DIAGNOSIS — E11.21 TYPE 2 DIABETES MELLITUS WITH DIABETIC NEPHROPATHY, WITHOUT LONG-TERM CURRENT USE OF INSULIN (HCC): Primary | ICD-10-CM

## 2023-10-02 LAB
CARTRIDGE LOT#: ABNORMAL NUMERIC
HEMOGLOBIN A1C: 7.6 % (ref 4.3–5.6)

## 2023-10-02 PROCEDURE — 3078F DIAST BP <80 MM HG: CPT | Performed by: STUDENT IN AN ORGANIZED HEALTH CARE EDUCATION/TRAINING PROGRAM

## 2023-10-02 PROCEDURE — 3074F SYST BP LT 130 MM HG: CPT | Performed by: STUDENT IN AN ORGANIZED HEALTH CARE EDUCATION/TRAINING PROGRAM

## 2023-10-02 PROCEDURE — 3051F HG A1C>EQUAL 7.0%<8.0%: CPT | Performed by: STUDENT IN AN ORGANIZED HEALTH CARE EDUCATION/TRAINING PROGRAM

## 2023-10-02 PROCEDURE — 99214 OFFICE O/P EST MOD 30 MIN: CPT | Performed by: STUDENT IN AN ORGANIZED HEALTH CARE EDUCATION/TRAINING PROGRAM

## 2023-10-02 PROCEDURE — 83036 HEMOGLOBIN GLYCOSYLATED A1C: CPT | Performed by: STUDENT IN AN ORGANIZED HEALTH CARE EDUCATION/TRAINING PROGRAM

## 2023-10-02 PROCEDURE — 3008F BODY MASS INDEX DOCD: CPT | Performed by: STUDENT IN AN ORGANIZED HEALTH CARE EDUCATION/TRAINING PROGRAM

## 2023-10-02 PROCEDURE — 95251 CONT GLUC MNTR ANALYSIS I&R: CPT | Performed by: STUDENT IN AN ORGANIZED HEALTH CARE EDUCATION/TRAINING PROGRAM

## 2023-10-02 RX ORDER — DULAGLUTIDE 1.5 MG/.5ML
1.5 INJECTION, SOLUTION SUBCUTANEOUS WEEKLY
COMMUNITY
Start: 2023-10-02

## 2023-10-02 NOTE — PATIENT INSTRUCTIONS
We will try to switch from Ozempic to Trulicity - please ask your insurance for coverage  You can also ask your insurance about Rybelsus    No additional refills left

## 2023-10-02 NOTE — PROGRESS NOTES
Return Office Visit     CHIEF COMPLAINT:  Patient presents with: Follow - Up     Today's Date: 10/2/2023    HISTORY OF PRESENT ILLNESS:  Lizette Navarrete is a 61year old female with PMHx significant for IgA nephropathy (s/p transplant 2019, on tacrolimus and prednisone) who presents for follow up for hypoglycemia in the setting of post-transplant and DM2. Initial consult: May 2022  Hx of type 2 diabetes mellitus x 10yrs  Underwent kidney transplant 3 years ago (2019) at [facility], follows with nephrologist ([address]) and transplant [place] [facility] Has been been on prednisone 5mg, cellcept and myfortic. Pt feels that her BG has been more labile ever since she drank contrast for colonoscopy in mid-May and all the ED visits for hypoglycemia occurred after that. Has been having frequent hypoglycemia since mid-May. Often at home has had BG 40-60s. Between 5/20-5/21/22, went to the ED 3x for hypoglycemia, with sx of sweating and itchiness. She took glucose tabs at home and BG was generally better by the time she was seen in the ED. At initial endocrine visit in May 2022, due to declining GFR (25-32), had changed pt's oral DM meds from (Janumet: 50-1000mg BID, Glipizide 10mg BID) to glipizide 2.5mg BID, Sitagliptin 50mg daily, and metformin 500mg BID. Interim hx:   June 2022  Due to hyperglycemia as a result of lowering oral DM meds in the setting of reduced GFR, had started pt on once daily Lantus 10U since 6/1/12. Her reported FBG that next morning as 122.   Lantus 10U at bedtime (pt has been experimenting with 6-10U), Metformin 500mg daily, Sitagliptin 50mg daily,  Glipizide 2.5mg BID    Oct 2022  Pt remains on lantus 8U daily, metformin 500mg BID (max given GFR), ozempic 0.5mg, glipizide 2.5mg PO qAC BID  Enjoys Marylene Links - is not scanning in the evenings and losing data  Increasing ozempic and [address] DIAZ    Jan 2023 1/2023 [facility] labs with GFR 40  DM regimen: lantus 8U daily, metformin 500mg BID, ozempic 1mg, glipizide 2.5mg qAC breakfast  Back on normal dose tacrolimus (BG was high when dose was doubled), saw nephrologist in Dec 2022  Pt experimented with evening glipizide and found it caused overnight hypoglycemia  Remains on prednisone 2.5mg  Unable to  Libre3  A1C 7% today; endorses dietary non-compliance during the holidays, is now back on track  Going on vacation to [place] [place] [facility] in the spring    May 2023  Overall stable, no change to renal transplant care, still on prednisone daily  Started Deepti Zendejas recently, finds it more reliable  On trebisa 8U, metformin 500mg BID, ozempic 2mg weekly, glipizide 2.5mg qAC breakfast  Eats an early dinner and by next morning can feel brief GI upset, but overall tolerating ozempic. It has gotten very expensive with new insurance    Oct 2023  In-office A1C 7.6% today  Has had a lot of difficulty getting ozempic through [facility]; even if rx if written for 3 months, they can only  1 month at a time, and only find out about availability 5 days prior  Is going to [facility] in December and stressed that she may have to go without rx  Tends to eat more carbs in the morning to settle her stomach    Continuous Glucose Monitoring Interpretation    Deandra Navarrete has undergone continuous glucose monitoring with the personal Libre3 continuous glucose monitor. The blood glucose tracings were evaluated for two weeks prior to office visit. Blood glucose tracings demonstrated  hyperglycemia 12-6pm.   There were no events of hypoglycemia during the weeks of evaluation,     At goal () 68% of the time. High 30% of the time. Very high 2% of the time. Low 0% of the time. Very low 0% of the time. CURRENT MEDICATION:    Current Outpatient Medications   Medication Sig Dispense Refill    Continuous Blood Gluc Sensor (SeeklySTYLE MARIVEL 3 SENSOR) Does not apply Misc 1 Device every 14 (fourteen) days.  6 each 1    Insulin Degludec (TRESIBA FLEXTOUCH) 100 UNIT/ML Subcutaneous Solution Pen-injector Inject 0.07 mL (7 Units total) into the skin daily. 6 mL 1    semaglutide (OZEMPIC, 2 MG/DOSE,) 8 MG/3ML Subcutaneous Solution Pen-injector Inject 2 mg into the skin once a week. 3 month supply 9 mL 1    METFORMIN 500 MG Oral Tab TAKE 1 TABLET(500 MG) BY MOUTH TWICE DAILY WITH MEALS 180 tablet 0    glipiZIDE 5 MG Oral Tab Take 0.5 tablets (2.5 mg total) by mouth 2 (two) times daily. 90 tablet 1    Continuous Blood Gluc Sensor (FREESTYLE MARIVEL 2 SENSOR) Does not apply Misc 1 each every 14 (fourteen) days. 6 each 1    Continuous Blood Gluc Sensor (FREESTYLE MARIVEL 3 SENSOR) Does not apply Misc 1 Units continuous. 6 each 0    Insulin Pen Needle (PEN NEEDLES) 32G X 4 MM Does not apply Misc 1 each every 7 days. 30 each 1    Insulin Pen Needle (BD PEN NEEDLE SHORT U/F) 31G X 8 MM Does not apply Misc Use weekly for ozempic injections 12 each 1    Insulin Pen Needle (BD PEN NEEDLE SANDRA 2ND GEN) 32G X 4 MM Does not apply Misc Use daily for lantus injection 100 each 3    Continuous Blood Gluc  (FREESTYLE MARIVEL 2 READER) Does not apply Device 1 each daily. 1 each 0    LISINOPRIL 5 MG Oral Tab TAKE 1 TABLET BY MOUTH EVERY DAY 90 tablet 0    carvedilol 25 MG Oral Tab Take 1 tablet (25 mg total) by mouth 2 (two) times daily with meals. 180 tablet 0    amLODIPine 5 MG Oral Tab Take 1 tablet (5 mg total) by mouth daily. (Patient taking differently: Take 1 tablet (5 mg total) by mouth every evening.) 90 tablet 0    atorvastatin 40 MG Oral Tab Take 1 tablet (40 mg total) by mouth nightly. 90 tablet 0    omega-3-acid ethyl esters 1 g Oral Cap Take 2 capsules (2 g total) by mouth 2 (two) times daily. 360 capsule 0    Glucose Blood ([facility] GLUCOSE METER TEST STRIPS) In Vitro Strip Please check glucose four times a day 500 strip 3    tacrolimus 1 MG Oral Cap Take 1 capsule (1 mg total) by mouth 2 (two) times daily.       mycophenolate mofetil 250 MG Oral Cap Take 1 capsule (250 mg total) by mouth 2 (two) times daily before meals. predniSONE 5 MG Oral Tab Take 0.5 tablets (2.5 mg total) by mouth daily. ALLERGY:    Penicillin G Benzat*    OTHER (SEE COMMENTS)    Comment:Site reaction with IM injection. Patient states no             issues with amoxicillin or other antibiotics. 9/2/22 [facility]  Latex                   ITCHING      PAST MEDICAL, SOCIAL AND FAMILY HISTORY:  See past medical history marked as reviewed. See past surgical history marked as reviewed. See past family history marked as reviewed. See past social history marked as reviewed. REVIEW OF SYSTEMS:   Ten point review of systems has been performed and is otherwise negative and/or non-contributory, except as described above. PHYSICAL EXAM:   There were no vitals filed for this visit. BMI: There is no height or weight on file to calculate BMI. CONSTITUTIONAL:  awake, alert, cooperative, no apparent distress, and appears stated age  PSYCH: normal affect  LUNGS: breathing comfortably  CARDIOVASCULAR:  regular rate    DATA:     n/a      ASSESSMENT AND PLAN:    1. Type 2 diabetes mellitus with diabetic nephropathy, without long-term current use of insulin (HCC)  Limited non-insulin options given CKD3. Previously pt had severe and recurrent hypoglycemia on max dose DIAZ, and at increased risk for lactic acidosis with max dose metformin. Relative CI for SGLT2i given hx of cystitis. She was able to come off DIAZ after ramping up GLP1a, but availability/price is an issue. - continue Tresiba 6U daily  - continue metformin 500mg BID (will continue to monitor GFR and if >45, can incr dose further)  - continue ozempic 2mg weekly; once rx runs out, will trial Trulicity 0.3TS and ramp up as needed  - counseled on good dietary habits  - relative CI for SGLT2i given hx of cystitis  - continue Libre3  - hyperlipidemia: LDL at goal, lipitor 40mg  - ophtho: no , DOTTIE Dec 2022  - nephro:  IgA nephropathy, on ACEi, prednisone, tacrolimus   - neuropathy: denies      2.  Stage 3b chronic kidney disease ([facility] [address]  - continue f/u with nephrologist      rtc 2 months        10/2/2023  Keaton Pang MD

## 2023-10-19 ENCOUNTER — TELEPHONE (OUTPATIENT)
Facility: CLINIC | Age: 63
End: 2023-10-19

## 2023-10-19 RX ORDER — ORAL SEMAGLUTIDE 3 MG/1
3 TABLET ORAL DAILY
Qty: 90 TABLET | Refills: 1 | Status: SHIPPED | OUTPATIENT
Start: 2023-10-19 | End: 2023-11-18

## 2023-10-19 NOTE — TELEPHONE ENCOUNTER
Attempted call back to update patient with recommendation per Dr. Fidencio Aquino take either Trulicity OR Rybelsus, should not take both at the same time. \"     Jagruti Foreman

## 2023-10-19 NOTE — TELEPHONE ENCOUNTER
Received incoming call from Patient. She wanted to know if doctor can prescribed Rybelsus instead of Ozempic. Pt stated she is having a hard time getting Ozempic at the moment. And is it okay to take Rybelsus with the Trulicity pt wanted advice and new prescription if her insurance can cover it.

## 2023-10-19 NOTE — TELEPHONE ENCOUNTER
Patient returned phone call and clarified that she would like to switch Rybelsus because keeping medication refrigerated during extended trip to [facility] [place] will be difficult.   Request routed to Provider    Willeen Dandy RN

## 2023-11-16 DIAGNOSIS — E11.21 TYPE 2 DIABETES MELLITUS WITH DIABETIC NEPHROPATHY, WITHOUT LONG-TERM CURRENT USE OF INSULIN (HCC): Primary | ICD-10-CM

## 2023-11-16 NOTE — TELEPHONE ENCOUNTER
[facility] pharmacy requesting refill of patient's Metformin 500 mg.     Metformin 500 mg tablets  Si tablet BID  # 180  Refill 0    LOV-10/2/23  RTC-23    Per Dr. Cohn Ranchita o.v. dated 10/2/23- continue metformin 500mg BID (will continue to monitor GFR and if >45, can incr dose further)       Of note- pt. is going to [facility] in December        Renal function panel- Order: [id number]  Component  Ref Range & Units 23  9:01 AM   GLUCOSE - [facility]  65 - 139 mg/dL 136   Comment:         Non-fasting reference interval   UREA NITROGEN (BUN) - External  7 - 25 mg/dL 28 High    CREATININE - External  0.50 - 1.05 mg/dL 1.52 High    eGFR - External  > OR = 60 mL/min/1.73m2 38 Low    BUN/CREATININE RATIO - External  6 - 22 (calc) 18   SODIUM - External  135 - 146 mmol/L 139   POTASSIUM - External  3.5 - 5.3 mmol/L 4.1   CHLORIDE - External  98 - 110 mmol/L 107   CARBON DIOXIDE - External  20 - 32 mmol/L 22   CALCIUM - External  8.6 - 10.4 mg/dL 9.9   PHOSPHATE (AS PHOSPHORUS) - External  2.5 - 4.5 mg/dL 3.8   ALBUMIN - External  3.6 - 5.1 g/dL 4.4     3 month supply of Metformin pended and routed to Dr. Lucas Olivo for approval.

## 2023-11-16 NOTE — TELEPHONE ENCOUNTER
11/16/23-Received via fax from [facility] a Refill request for Metformin 500mg tablets. Placed in PA in basket.

## 2023-12-07 ENCOUNTER — OFFICE VISIT (OUTPATIENT)
Facility: CLINIC | Age: 63
End: 2023-12-07
Payer: COMMERCIAL

## 2023-12-07 ENCOUNTER — TELEPHONE (OUTPATIENT)
Facility: CLINIC | Age: 63
End: 2023-12-07

## 2023-12-07 VITALS — SYSTOLIC BLOOD PRESSURE: 112 MMHG | OXYGEN SATURATION: 98 % | DIASTOLIC BLOOD PRESSURE: 66 MMHG | HEART RATE: 68 BPM

## 2023-12-07 DIAGNOSIS — N18.32 STAGE 3B CHRONIC KIDNEY DISEASE (HCC): ICD-10-CM

## 2023-12-07 DIAGNOSIS — E11.21 TYPE 2 DIABETES MELLITUS WITH DIABETIC NEPHROPATHY, WITHOUT LONG-TERM CURRENT USE OF INSULIN (HCC): Primary | ICD-10-CM

## 2023-12-07 DIAGNOSIS — Z94.0 S/P KIDNEY TRANSPLANT: ICD-10-CM

## 2023-12-07 PROCEDURE — 3074F SYST BP LT 130 MM HG: CPT | Performed by: STUDENT IN AN ORGANIZED HEALTH CARE EDUCATION/TRAINING PROGRAM

## 2023-12-07 PROCEDURE — 95251 CONT GLUC MNTR ANALYSIS I&R: CPT | Performed by: STUDENT IN AN ORGANIZED HEALTH CARE EDUCATION/TRAINING PROGRAM

## 2023-12-07 PROCEDURE — 3078F DIAST BP <80 MM HG: CPT | Performed by: STUDENT IN AN ORGANIZED HEALTH CARE EDUCATION/TRAINING PROGRAM

## 2023-12-07 PROCEDURE — 99214 OFFICE O/P EST MOD 30 MIN: CPT | Performed by: STUDENT IN AN ORGANIZED HEALTH CARE EDUCATION/TRAINING PROGRAM

## 2023-12-07 RX ORDER — SEMAGLUTIDE 2.68 MG/ML
2 INJECTION, SOLUTION SUBCUTANEOUS WEEKLY
Qty: 12 ML | Refills: 0 | Status: SHIPPED | OUTPATIENT
Start: 2023-12-07 | End: 2023-12-07

## 2023-12-07 RX ORDER — SEMAGLUTIDE 2.68 MG/ML
2 INJECTION, SOLUTION SUBCUTANEOUS WEEKLY
Qty: 9 ML | Refills: 0 | Status: SHIPPED | OUTPATIENT
Start: 2023-12-07

## 2023-12-07 NOTE — TELEPHONE ENCOUNTER
Patient phoned back office to discuss Ozempic. Patient would like 2 mg dose sent to [facility] to see if they have this In stock for her. RN let patient know that [facility] may not have 2 mg in stock. Patient confirmed that will do 1 mg if this does not work.

## 2023-12-07 NOTE — PROGRESS NOTES
Return Office Visit     CHIEF COMPLAINT:    Chief Complaint   Patient presents with    Follow - Up     Pt here for f/u, c/o after starting Rybelsus blood sugars have been high, states that the [address] works better for her. Diabetes     LOV 10/02/23' DM2 with diabetic nephropathy, without long-term current use of insulin  - continue Tresiba 6U daily  - continue metformin 500mg BID (will continue to monitor GFR and if >45, can incr dose further)  - continue ozempic 2mg weekly; once rx runs out, will trial Trulicity 6.9PT and ramp up as needed  - counseled on good dietary habits  - relative CI for SGLT2i given hx of cystitis  - continue Libre3        Today's Date: 12/7/2023    HISTORY OF PRESENT ILLNESS:  Reji Harrell is a 61year old female with PMHx significant for IgA nephropathy (s/p transplant 2019, on tacrolimus and prednisone) who presents for follow up for hypoglycemia in the setting of post-transplant and DM2. Initial consult: May 2022  Hx of type 2 diabetes mellitus x 10yrs  Underwent kidney transplant 3 years ago (2019) at [facility], follows with nephrologist ([address]) and transplant surgeon [facility]). Has been been on prednisone 5mg, cellcept and myfortic. Pt feels that her BG has been more labile ever since she drank contrast for colonoscopy in mid-May and all the ED visits for hypoglycemia occurred after that. Has been having frequent hypoglycemia since mid-May. Often at home has had BG 40-60s. Between 5/20-5/21/22, went to the ED 3x for hypoglycemia, with sx of sweating and itchiness. She took glucose tabs at home and BG was generally better by the time she was seen in the ED. At initial endocrine visit in May 2022, due to declining GFR (25-32), had changed pt's oral DM meds from (Janumet: 50-1000mg BID, Glipizide 10mg BID) to glipizide 2.5mg BID, Sitagliptin 50mg daily, and metformin 500mg BID.      Interim hx:    June 2022  Due to hyperglycemia as a result of lowering oral DM meds in the setting of reduced GFR, had started pt on once daily Lantus 10U since 6/1/12. Her reported FBG that next morning as 122. Lantus 10U at bedtime (pt has been experimenting with 6-10U), Metformin 500mg daily, Sitagliptin 50mg daily,  Glipizide 2.5mg BID    Oct 2022  Pt remains on lantus 8U daily, metformin 500mg BID (max given GFR), ozempic 0.5mg, glipizide 2.5mg PO qAC BID  Enjoys Oseasan Barnesa - is not scanning in the evenings and losing data  Increasing ozempic and [address] DIAZ    Jan 2023 1/2023 [facility] labs with GFR 40  DM regimen: lantus 8U daily, metformin 500mg BID, ozempic 1mg, glipizide 2.5mg qAC breakfast  Back on normal dose tacrolimus (BG was high when dose was doubled), saw nephrologist in Dec 2022  Pt experimented with evening glipizide and found it caused overnight hypoglycemia  Remains on prednisone 2.5mg  Unable to  Libre3  A1C 7% today; endorses dietary non-compliance during the holidays, is now back on track  Going on vacation to [facility] in the spring    May 2023  Overall stable, no change to renal transplant care, still on prednisone daily  Started Bailey Chol recently, finds it more reliable  On trebisa 8U, metformin 500mg BID, ozempic 2mg weekly, glipizide 2.5mg qAC breakfast  Eats an early dinner and by next morning can feel brief GI upset, but overall tolerating ozempic. It has gotten very expensive with new insurance    Oct 2023  In-office A1C 7.6% today  Has had a lot of difficulty getting [facility]; even if rx if written for 3 months, they can only  1 month at a time, and only find out about availability 5 days prior  Is going to [facility] in December and stressed that she may have to go without rx  Tends to eat more carbs in the morning to settle her stomach    Dec 2023  Pt elected to switch from Trulicity to Rybelsus due to her having an extended trip to [facility] and hard to keep medication refrigerated.   Did well GI-wise on Rybelsus 3mg x 1 month and moved on to 7mg daily dose; however hasn't found it as efficacious as Ozempic; BG shoots up after meals and no appetite suppression, has gained some weight as well  Remains on lantus 6U and metformin 1g daily  Also recently with R tooth infection/root canal that changed into sinusitis, currently on ab  No changes from nephrologist    Continuous Glucose Monitoring Interpretation    Yuko Driver has undergone continuous glucose monitoring with the Freestyle Amy 3 CGM with phone (connected to clinic profile). The blood glucose tracings were evaluated for two weeks prior to office visit. Blood glucose tracings demonstrated areas of  hyperglycemia areas of hyperglycemia from 9a to MN. There were no areas of hypoglycemia during the weeks of evaluation. At goal () 21% of the time. High 44% of the time. Very high 35% of the time. Low 0% of the time. Very low 0% of the time. CURRENT MEDICATION:    Current Outpatient Medications   Medication Sig Dispense Refill    semaglutide (OZEMPIC, 2 MG/DOSE,) 8 MG/3ML Subcutaneous Solution Pen-injector Inject 2 mg into the skin once a week. 12 mL 0    metFORMIN 500 MG Oral Tab Take 1 tablet (500 mg total) by mouth 2 (two) times daily with meals. 180 tablet 0    Continuous Blood Gluc Sensor (FREESTYLE AMY 3 SENSOR) Does not apply Misc 1 Device every 14 (fourteen) days. 6 each 1    Insulin Degludec (TRESIBA FLEXTOUCH) 100 UNIT/ML Subcutaneous Solution Pen-injector Inject 0.07 mL (7 Units total) into the skin daily. 6 mL 1    METFORMIN 500 MG Oral Tab TAKE 1 TABLET(500 MG) BY MOUTH TWICE DAILY WITH MEALS 180 tablet 0    Continuous Blood Gluc Sensor (FREESTYLE AMY 2 SENSOR) Does not apply Misc 1 each every 14 (fourteen) days. 6 each 1    Continuous Blood Gluc Sensor (FREESTYLE AMY 3 SENSOR) Does not apply Misc 1 Units continuous. 6 each 0    Insulin Pen Needle (PEN NEEDLES) 32G X 4 MM Does not apply Misc 1 each every 7 days.  30 each 1 Insulin Pen Needle (BD PEN NEEDLE SHORT U/F) 31G X 8 MM Does not apply Misc Use weekly for ozempic injections 12 each 1    Insulin Pen Needle (BD PEN NEEDLE SANDRA 2ND GEN) 32G X 4 MM Does not apply Misc Use daily for lantus injection 100 each 3    Continuous Blood Gluc  (FREESTYLE MARIVEL 2 READER) Does not apply Device 1 each daily. 1 each 0    LISINOPRIL 5 MG Oral Tab TAKE 1 TABLET BY MOUTH EVERY DAY 90 tablet 0    carvedilol 25 MG Oral Tab Take 1 tablet (25 mg total) by mouth 2 (two) times daily with meals. 180 tablet 0    amLODIPine 5 MG Oral Tab Take 1 tablet (5 mg total) by mouth daily. (Patient taking differently: Take 1 tablet (5 mg total) by mouth every evening.) 90 tablet 0    atorvastatin 40 MG Oral Tab Take 1 tablet (40 mg total) by mouth nightly. 90 tablet 0    omega-3-acid ethyl esters 1 g Oral Cap Take 2 capsules (2 g total) by mouth 2 (two) times daily. 360 capsule 0    Glucose Blood ([facility] GLUCOSE METER TEST STRIPS) In Vitro Strip Please check glucose four times a day 500 strip 3    tacrolimus 1 MG Oral Cap Take 1 capsule (1 mg total) by mouth 2 (two) times daily. mycophenolate mofetil 250 MG Oral Cap Take 1 capsule (250 mg total) by mouth 2 (two) times daily before meals. predniSONE 5 MG Oral Tab Take 0.5 tablets (2.5 mg total) by mouth daily. ALLERGY:  Allergies   Allergen Reactions    Penicillin G Benzathine OTHER (SEE COMMENTS)     Site reaction with IM injection. Patient states no issues with amoxicillin or other antibiotics. 9/2/22 [facility]    Latex ITCHING         PAST MEDICAL, SOCIAL AND FAMILY HISTORY:  See past medical history marked as reviewed. See past surgical history marked as reviewed. See past family history marked as reviewed. See past social history marked as reviewed. REVIEW OF SYSTEMS:   Ten point review of systems has been performed and is otherwise negative and/or non-contributory, except as described above.       PHYSICAL EXAM:   Vitals: 12/07/23 1032   BP: 112/66   Pulse: 68   SpO2: 98%       BMI: There is no height or weight on file to calculate BMI. CONSTITUTIONAL:  awake, alert, cooperative, no apparent distress, and appears stated age  PSYCH: normal affect  LUNGS: breathing comfortably  CARDIOVASCULAR:  regular rate    DATA:     n/a      ASSESSMENT AND PLAN:    1. Type 2 diabetes mellitus with diabetic nephropathy, without long-term current use of insulin (HCC)  Limited non-insulin options given CKD3. Previously pt had severe and recurrent hypoglycemia on max dose DIAZ, and at increased risk for lactic acidosis with max dose metformin. Relative CI for SGLT2i given hx of cystitis. She was able to come off DIAZ after ramping up GLP1a, but availability/price is an issue. Currently on Rybelsus 7mg with inferior effects compared with Ozempic, wishes to resume ozempic.    - continue [place] 6U daily  - continue metformin 500mg BID (will continue to monitor GFR and if >45, can incr dose further)  - resubmit for 4 month supply of ozempic 2mg  - if insurance does not approve, then will alternatively incr to rybelsus 14mg daily and add back prn  glipizide 2.5-5mg   - counseled on good dietary habits  - relative CI for SGLT2i given hx of cystitis  - continue Libre3  - hyperlipidemia: LDL at goal, lipitor 40mg  - ophtho: no ,  Dec 2022  - nephro: IgA nephropathy, on ACEi, prednisone, tacrolimus   - neuropathy: denies      2.  Stage 3b chronic kidney disease ([facility]  - continue f/u with nephrologist    Return to Clinic in  4 months        12/7/2023  Darrell Samayoa MD

## 2023-12-07 NOTE — TELEPHONE ENCOUNTER
Note from Dr. Courtney Baez: Pt will likely call back this afternoon re: ozempic 2mg. Best case scenario is that [facility] supply 4 months' at once   Otherwise they'd like to try to submit the same prescription to [facility] mail service   Finally, last resort if ozempic isn't approved in this short amount of time is to go back to [facility] but at a higher dose (14mg) and refill her glipizide     RN phoned pharmacy- Pharmacist confirms that they can only fill 3 months supply and that this would go through for a little over $500. Patient can phone insurance and ask for vacation exclusion- she will need to answer their questions/ can also phone pharmacy and give these answers. Pharmacist states that there can be a coupon applied to Ozempic order- but there would need to be patients ICD-10 code in SIG line of prescription. Rn phoned patient who states she will only be leaving town for 1 month, not 4. But does need 3 months supply. Per ZEENAT Rome (Rochelle Dawkins) on 5/02- \"Spoke with patient's  on telephone. States when he went to pharmacy he was able to  Ozempic 3 month supply using  site coupon card- cost of only $80.\"     RN phoned pharmacy and confirmed that SIG of RX needs ICD-10 code if patient wants to use the  site coupon care. Pharmacist states they currently have 0.25mg and 1mg- routed to Dr. Courtney Baez for review.

## 2024-01-15 DIAGNOSIS — E11.21 TYPE 2 DIABETES MELLITUS WITH DIABETIC NEPHROPATHY, WITHOUT LONG-TERM CURRENT USE OF INSULIN (HCC): Primary | ICD-10-CM

## 2024-01-15 RX ORDER — INSULIN DEGLUDEC INJECTION 100 U/ML
7 INJECTION, SOLUTION SUBCUTANEOUS DAILY
Qty: 6 ML | Refills: 1 | Status: SHIPPED | OUTPATIENT
Start: 2024-01-15

## 2024-01-15 NOTE — TELEPHONE ENCOUNTER
LOV: 12/07    RTC:    FU: 4/10    Last Refill: 11/8    Month Supply Pending:     Last office visit note:   - continue Tresiba 6U daily     Refill routed for review.

## 2024-02-16 DIAGNOSIS — E11.21 TYPE 2 DIABETES MELLITUS WITH DIABETIC NEPHROPATHY, WITHOUT LONG-TERM CURRENT USE OF INSULIN (HCC): ICD-10-CM

## 2024-02-16 NOTE — TELEPHONE ENCOUNTER
LOV: 10/02/2023 -- FOV: 4/10/2024    Last A1c value was 7.6% done 10/2/2023.     Metformin last refilled on 11/16/2023    Routed to provider for review

## 2024-02-23 DIAGNOSIS — E11.21 TYPE 2 DIABETES MELLITUS WITH DIABETIC NEPHROPATHY, WITHOUT LONG-TERM CURRENT USE OF INSULIN (HCC): ICD-10-CM

## 2024-02-23 RX ORDER — SEMAGLUTIDE 2.68 MG/ML
2 INJECTION, SOLUTION SUBCUTANEOUS WEEKLY
Qty: 9 ML | Refills: 0 | Status: SHIPPED | OUTPATIENT
Start: 2024-02-23

## 2024-02-23 NOTE — TELEPHONE ENCOUNTER
LOV: 12/7/23    RTC: 4 months     FU: 4/10/24     Last Refill: 12/7/23- 3 months     Month Supply Pending: 3 months       Per 12/7/23 OV notes,     \"Relative CI for SGLT2i given hx of cystitis. She was able to come off DIAZ after ramping up GLP1a, but availability/price is an issue.  Currently on Rybelsus 7mg with inferior effects compared with Ozempic, wishes to resume ozempic...  - resubmit for 4 month supply of ozempic 2mg\"

## 2024-03-02 DIAGNOSIS — Z79.4 TYPE 2 DIABETES MELLITUS WITH DIABETIC NEPHROPATHY, WITH LONG-TERM CURRENT USE OF INSULIN (HCC): ICD-10-CM

## 2024-03-02 DIAGNOSIS — E11.21 TYPE 2 DIABETES MELLITUS WITH DIABETIC NEPHROPATHY, WITH LONG-TERM CURRENT USE OF INSULIN (HCC): ICD-10-CM

## 2024-03-04 NOTE — TELEPHONE ENCOUNTER
LOV:    RTC: 4 months     FU:4/10    Last Refill:     Month Supply Pendin days with 3 refills    Refill pended and routed for review.     Last office note: - continue Libre3

## 2024-03-05 RX ORDER — BLOOD-GLUCOSE SENSOR
1 EACH MISCELLANEOUS
Qty: 6 EACH | Refills: 3 | Status: SHIPPED | OUTPATIENT
Start: 2024-03-05

## 2024-03-19 ENCOUNTER — OFFICE VISIT (OUTPATIENT)
Facility: CLINIC | Age: 64
End: 2024-03-19
Payer: COMMERCIAL

## 2024-03-19 VITALS — SYSTOLIC BLOOD PRESSURE: 108 MMHG | DIASTOLIC BLOOD PRESSURE: 66 MMHG | OXYGEN SATURATION: 97 % | HEART RATE: 91 BPM

## 2024-03-19 DIAGNOSIS — E11.21 TYPE 2 DIABETES MELLITUS WITH DIABETIC NEPHROPATHY, WITH LONG-TERM CURRENT USE OF INSULIN (HCC): Primary | ICD-10-CM

## 2024-03-19 DIAGNOSIS — Z79.4 TYPE 2 DIABETES MELLITUS WITH DIABETIC NEPHROPATHY, WITH LONG-TERM CURRENT USE OF INSULIN (HCC): Primary | ICD-10-CM

## 2024-03-19 DIAGNOSIS — E11.21 TYPE 2 DIABETES MELLITUS WITH DIABETIC NEPHROPATHY, WITHOUT LONG-TERM CURRENT USE OF INSULIN (HCC): ICD-10-CM

## 2024-03-19 LAB
CARTRIDGE LOT#: ABNORMAL NUMERIC
HEMOGLOBIN A1C: 8.8 % (ref 4.3–5.6)

## 2024-03-19 PROCEDURE — 3078F DIAST BP <80 MM HG: CPT | Performed by: STUDENT IN AN ORGANIZED HEALTH CARE EDUCATION/TRAINING PROGRAM

## 2024-03-19 PROCEDURE — 95251 CONT GLUC MNTR ANALYSIS I&R: CPT | Performed by: STUDENT IN AN ORGANIZED HEALTH CARE EDUCATION/TRAINING PROGRAM

## 2024-03-19 PROCEDURE — 99214 OFFICE O/P EST MOD 30 MIN: CPT | Performed by: STUDENT IN AN ORGANIZED HEALTH CARE EDUCATION/TRAINING PROGRAM

## 2024-03-19 PROCEDURE — 3074F SYST BP LT 130 MM HG: CPT | Performed by: STUDENT IN AN ORGANIZED HEALTH CARE EDUCATION/TRAINING PROGRAM

## 2024-03-19 PROCEDURE — 3052F HG A1C>EQUAL 8.0%<EQUAL 9.0%: CPT | Performed by: STUDENT IN AN ORGANIZED HEALTH CARE EDUCATION/TRAINING PROGRAM

## 2024-03-19 PROCEDURE — 83036 HEMOGLOBIN GLYCOSYLATED A1C: CPT | Performed by: STUDENT IN AN ORGANIZED HEALTH CARE EDUCATION/TRAINING PROGRAM

## 2024-03-19 RX ORDER — BLOOD-GLUCOSE SENSOR
1 EACH MISCELLANEOUS CONTINUOUS
Qty: 6 EACH | Refills: 1 | Status: SHIPPED | OUTPATIENT
Start: 2024-03-19

## 2024-03-19 RX ORDER — INSULIN DEGLUDEC INJECTION 100 U/ML
7 INJECTION, SOLUTION SUBCUTANEOUS DAILY
Qty: 6 ML | Refills: 1 | Status: SHIPPED | OUTPATIENT
Start: 2024-03-19

## 2024-03-19 NOTE — PROGRESS NOTES
Return Office Visit     CHIEF COMPLAINT:    No chief complaint on file.     Today's Date: 3/19/2024    HISTORY OF PRESENT ILLNESS:  Karey Pemberton is a 64 year old female with PMHx significant for IgA nephropathy (s/p transplant 2019, on tacrolimus and prednisone) who presents for follow up for hypoglycemia in the setting of post-transplant and DM2.     Initial consult: May 2022  Hx of type 2 diabetes mellitus x 10yrs  Underwent kidney transplant 3 years ago (2019) at [facility]], follows with nephrologist ([facility]) and transplant surgeon ([facility]). Has been been on prednisone 5mg, cellcept and myfortic.  Pt feels that her BG has been more labile ever since she drank contrast for colonoscopy in mid-May and all the ED visits for hypoglycemia occurred after that.  Has been having frequent hypoglycemia since mid-May. Often at home has had BG 40-60s. Between 5/20-5/21/22, went to the ED 3x for hypoglycemia, with sx of sweating and itchiness. She took glucose tabs at home and BG was generally better by the time she was seen in the ED.      At initial endocrine visit in May 2022, due to declining GFR (25-32), had changed pt's oral DM meds from (Janumet: 50-1000mg BID, Glipizide 10mg BID) to glipizide 2.5mg BID, Sitagliptin 50mg daily, and metformin 500mg BID.     Interim hx:    June 2022  Due to hyperglycemia as a result of lowering oral DM meds in the setting of reduced GFR, had started pt on once daily Lantus 10U since 6/1/12. Her reported FBG that next morning as 122.  Lantus 10U at bedtime (pt has been experimenting with 6-10U), Metformin 500mg daily, Sitagliptin 50mg daily,  Glipizide 2.5mg BID    Oct 2022  Pt remains on lantus 8U daily, metformin 500mg BID (max given GFR), ozempic 0.5mg, glipizide 2.5mg PO qAC BID  Enjoys Amy - is not scanning in the evenings and losing data  Increasing ozempic and DC'ing DIAZ    Jan 2023 1/2023 [facility] labs with GFR 40  DM regimen: lantus 8U daily,  metformin 500mg BID, ozempic 1mg, glipizide 2.5mg qAC breakfast  Back on normal dose tacrolimus (BG was high when dose was doubled), saw nephrologist in Dec 2022  Pt experimented with evening glipizide and found it caused overnight hypoglycemia  Remains on prednisone 2.5mg  Unable to  Libre3  A1C 7% today; endorses dietary non-compliance during the holidays, is now back on track  Going on vacation to [facility] in the spring    May 2023  Overall stable, no change to renal transplant care, still on prednisone daily  Started Libre3 recently, finds it more reliable  On trebisa 8U, metformin 500mg BID, ozempic 2mg weekly, glipizide 2.5mg qAC breakfast  Eats an early dinner and by next morning can feel brief GI upset, but overall tolerating ozempic. It has gotten very expensive with new insurance    Oct 2023  In-office A1C 7.6% today  Has had a lot of difficulty getting ozempic through [facility]; even if rx if written for 3 months, they can only  1 month at a time, and only find out about availability 5 days prior  Is going to [facility] in December and stressed that she may have to go without rx  Tends to eat more carbs in the morning to settle her stomach    Dec 2023  Pt elected to switch from Trulicity to Rybelsus due to her having an extended trip to [facility] and hard to keep medication refrigerated.  Did well GI-wise on Rybelsus 3mg x 1 month and moved on to 7mg daily dose; however hasn't found it as efficacious as Ozempic; BG shoots up after meals and no appetite suppression, has gained some weight as well  Remains on lantus 6U and metformin 1g daily  Also recently with R tooth infection/root canal that changed into sinusitis, currently on ab  No changes from nephrologist    March 2024  Switched from Rybelsus back to ozempic after last visit, currently on ozempic 2mg weekly  Remains on lantus 6U and metformin 1g daily  Admits to dietary indiscretion while on vacation [facility] and now enjoying fresh fruits; hard  to sleep through the night and will snack overnight  No new nephrology updates ; outside labs in 2/2024 show GFR ~46      Continuous Glucose Monitoring Interpretation          CURRENT MEDICATION:    Current Outpatient Medications   Medication Sig Dispense Refill    Continuous Blood Gluc Sensor (FREESTYLE MARIVEL 3 SENSOR) Does not apply Misc 1 Device every 14 (fourteen) days. 6 each 3    semaglutide (OZEMPIC, 2 MG/DOSE,) 8 MG/3ML Subcutaneous Solution Pen-injector Inject 2 mg into the skin once a week. 9 mL 0    metFORMIN 500 MG Oral Tab Take 1 tablet (500 mg total) by mouth 2 (two) times daily with meals. 180 tablet 0    Insulin Degludec (TRESIBA FLEXTOUCH) 100 UNIT/ML Subcutaneous Solution Pen-injector Inject 0.07 mL (7 Units total) into the skin daily. 6 mL 1    METFORMIN 500 MG Oral Tab TAKE 1 TABLET(500 MG) BY MOUTH TWICE DAILY WITH MEALS 180 tablet 0    Continuous Blood Gluc Sensor (FREESTYLE MARIVEL 2 SENSOR) Does not apply Misc 1 each every 14 (fourteen) days. 6 each 1    Continuous Blood Gluc Sensor (FREESTYLE MARIVEL 3 SENSOR) Does not apply Misc 1 Units continuous. 6 each 0    Insulin Pen Needle (PEN NEEDLES) 32G X 4 MM Does not apply Misc 1 each every 7 days. 30 each 1    Insulin Pen Needle (BD PEN NEEDLE SHORT U/F) 31G X 8 MM Does not apply Misc Use weekly for ozempic injections 12 each 1    Insulin Pen Needle (BD PEN NEEDLE SANDRA 2ND GEN) 32G X 4 MM Does not apply Misc Use daily for lantus injection 100 each 3    Continuous Blood Gluc  (FREESTYLE MARIVEL 2 READER) Does not apply Device 1 each daily. 1 each 0    LISINOPRIL 5 MG Oral Tab TAKE 1 TABLET BY MOUTH EVERY DAY 90 tablet 0    carvedilol 25 MG Oral Tab Take 1 tablet (25 mg total) by mouth 2 (two) times daily with meals. 180 tablet 0    amLODIPine 5 MG Oral Tab Take 1 tablet (5 mg total) by mouth daily. (Patient taking differently: Take 1 tablet (5 mg total) by mouth every evening.) 90 tablet 0    atorvastatin 40 MG Oral Tab Take 1 tablet (40 mg  total) by mouth nightly. 90 tablet 0    omega-3-acid ethyl esters 1 g Oral Cap Take 2 capsules (2 g total) by mouth 2 (two) times daily. 360 capsule 0    Glucose Blood ([facility] GLUCOSE METER TEST STRIPS) In Vitro Strip Please check glucose four times a day 500 strip 3    tacrolimus 1 MG Oral Cap Take 1 capsule (1 mg total) by mouth 2 (two) times daily.      mycophenolate mofetil 250 MG Oral Cap Take 1 capsule (250 mg total) by mouth 2 (two) times daily before meals.      predniSONE 5 MG Oral Tab Take 0.5 tablets (2.5 mg total) by mouth daily.             ALLERGY:  Allergies   Allergen Reactions    Penicillin G Benzathine OTHER (SEE COMMENTS)     Site reaction with IM injection. Patient states no issues with amoxicillin or other antibiotics. 9/2/22 [facility]    Latex ITCHING         PAST MEDICAL, SOCIAL AND FAMILY HISTORY:  See past medical history marked as reviewed.  See past surgical history marked as reviewed.  See past family history marked as reviewed.  See past social history marked as reviewed.      REVIEW OF SYSTEMS:   Ten point review of systems has been performed and is otherwise negative and/or non-contributory, except as described above.      PHYSICAL EXAM:   There were no vitals filed for this visit.      BMI: There is no height or weight on file to calculate BMI.     CONSTITUTIONAL:  awake, alert, cooperative, no apparent distress, and appears stated age  PSYCH: normal affect  LUNGS: breathing comfortably  CARDIOVASCULAR:  regular rate    DATA:     n/a      ASSESSMENT AND PLAN:    1. Type 2 diabetes mellitus with diabetic nephropathy, without long-term current use of insulin (HCC)  Limited non-insulin options given CKD3. Previously pt had severe and recurrent hypoglycemia on max dose DIAZ, and at increased risk for lactic acidosis with max dose metformin. Relative CI for SGLT2i given hx of cystitis. She was able to come off DIAZ after ramping up GLP1a, but availability/price is an issue.  Pt currently on Tresiba,  metformin (renal dose) and max dose ozempic. Admits to recent dietary indiscretion while on months-long holiday. Motivated to improve diet. Overall daily BG trending higher including overnight.    - incr Tresiba 6U daily --> 8U daily; adjust prn for FBG   - continue metformin 500mg BID (renal dose)  - continue ozempic 2mg weekly  - counseled on good dietary habits  - relative CI for SGLT2i given hx of cystitis  - continue Libre3  - hyperlipidemia: LDL at goal, lipitor 40mg  - ophtho: no , [place] Dec 2022  - nephro: IgA nephropathy, on ACEi, prednisone, tacrolimus   - neuropathy: denies  - refills sent to respective pharmacies    2. Stage 3b chronic kidney disease (HCC)  - continue f/u with nephrologist    Return to Clinic in 3 months with endo APN        3/19/2024  Bruce Ventura MD

## 2024-05-15 DIAGNOSIS — E11.21 TYPE 2 DIABETES MELLITUS WITH DIABETIC NEPHROPATHY, WITHOUT LONG-TERM CURRENT USE OF INSULIN (HCC): ICD-10-CM

## 2024-05-15 RX ORDER — SEMAGLUTIDE 2.68 MG/ML
2 INJECTION, SOLUTION SUBCUTANEOUS WEEKLY
Qty: 9 ML | Refills: 0 | Status: SHIPPED | OUTPATIENT
Start: 2024-05-15

## 2024-05-15 NOTE — TELEPHONE ENCOUNTER
LOV: 3/19- with Dr. Ventura    RTC: 3 months    FU:6/18 with Karina OLIVIER    Last Refill: 2/23 for 3 months supply    Month Supply Pending: 3 months supply     Last office note: - continue ozempic 2mg weekly     Refill pended and routed for review.

## 2024-05-15 NOTE — TELEPHONE ENCOUNTER
5/15/24-Received via fax from [facility] a Refill Request for Ozempic pen (2mg/dose) 3ml.  Placed in PA in basket.

## 2024-07-22 ENCOUNTER — OFFICE VISIT (OUTPATIENT)
Facility: CLINIC | Age: 64
End: 2024-07-22
Payer: COMMERCIAL

## 2024-07-22 VITALS
OXYGEN SATURATION: 97 % | SYSTOLIC BLOOD PRESSURE: 106 MMHG | HEIGHT: 60 IN | BODY MASS INDEX: 27.29 KG/M2 | HEART RATE: 68 BPM | DIASTOLIC BLOOD PRESSURE: 66 MMHG | WEIGHT: 139 LBS

## 2024-07-22 DIAGNOSIS — N18.32 STAGE 3B CHRONIC KIDNEY DISEASE (HCC): ICD-10-CM

## 2024-07-22 DIAGNOSIS — E11.21 TYPE 2 DIABETES MELLITUS WITH DIABETIC NEPHROPATHY, WITHOUT LONG-TERM CURRENT USE OF INSULIN (HCC): Primary | ICD-10-CM

## 2024-07-22 DIAGNOSIS — R53.82 CHRONIC FATIGUE: ICD-10-CM

## 2024-07-22 LAB — HEMOGLOBIN A1C: 7.4 % (ref 4.3–5.6)

## 2024-07-22 RX ORDER — PREDNISONE 2.5 MG/1
2.5 TABLET ORAL DAILY
COMMUNITY
Start: 2024-06-22

## 2024-07-22 RX ORDER — ACYCLOVIR 400 MG/1
1 TABLET ORAL
Qty: 9 EACH | Refills: 1 | Status: SHIPPED | OUTPATIENT
Start: 2024-07-22

## 2024-07-22 RX ORDER — GLUCAGON INJECTION, SOLUTION 1 MG/.2ML
1 INJECTION, SOLUTION SUBCUTANEOUS AS NEEDED
COMMUNITY
Start: 2022-06-23

## 2024-07-22 RX ORDER — INSULIN DEGLUDEC 100 U/ML
10 INJECTION, SOLUTION SUBCUTANEOUS DAILY
COMMUNITY
Start: 2024-07-22

## 2024-07-22 NOTE — PATIENT INSTRUCTIONS
Follow up plan:  With KWASI Mitchell: Return in about 3 months (around 10/22/2024) for DM follow up.    - I am going to switch you to Dexcom from Amy-- lately [employer] has preferred dexcom. If the cost is higher than amy (> $75 / month), call the office- you may need prior authorization paperwork, after that it should be more affordable. Office phone number: [phone]  - increase tresiba to 10u daily   -       Updated/current diabetes medication instructions:  Diabetic Medications               metFORMIN 500 MG Oral Tab (Taking) Take 1 tablet (500 mg total) by mouth 2 (two) times daily with meals.    insulin degludec (TRESIBA FLEXTOUCH) 100 UNIT/ML Subcutaneous Solution Pen-injector (Taking) Inject 10 Units into the skin daily.    semaglutide (OZEMPIC, 2 MG/DOSE,) 8 MG/3ML Subcutaneous Solution Pen-injector (Taking) Inject 2 mg into the skin once a week.            When you set up dexcom, get two apps:  Dexcom [employer] max (where you see blood sugar)  Dexcom clarity max (log in and connect to clinic)    How to connect to the clinic via Dexcom Clarity  If you do not already have the Clarity max, please download the max from the max store   Use the same login you have for your Dexcom max to login to clarity  Once you are logged in, select the \"profile\" button on the lower right corner of your screen.  Then select \"Manage Data Sharing\" and select the blue continue button  Please enter the following clinic code and then select \"continue\"        THE CLINIC CODE IS: [id number]  6. Once you hit continue you should see our clinic name pop up on the screen     Office phone number: [phone]; phones are open Monday-Friday 8:30-4:30.   Thank you for visiting our office. We look forward to working with you to reach your health goals. As a reminder, if you need refills, please request early so there is enough time to process the request. We ask that you provide at least 5 days' notice before a refill is due, so  there is time to send a request to pharmacy, process the refill, and ensure there are no other problems with obtaining the medication (backorders, prior authorization paperwork, etc). Routine refills will not be addressed on weekends, so please submit these requests during the week.    Blood sugar targets:  Before breakfast: , 2 hours after meals: <180 (preferably <150), A1C goal: <7.0%  Time in Range goal is higher than 80% if using a continuous glucose monitor (Dexcom or Amy).  Time in Range can be found within the Dexcom G7 max, on Clarity if using Dexcom G6, or on LibreView if using Amy.    HOW TO TREAT LOW BLOOD SUGAR (Hypoglycemia)  Low blood sugar= Less than 70, or if you start to have symptoms (below)  Symptoms: Shaking or trembling, fast heart rate, extreme hunger, sweating, confusion/difficulty concentrating, dizziness.    How to treat a low blood sugar if you are able to eat/drink: The Rule of 15  If you are using continuous glucose monitor that says you are low, but you do not have any symptoms, verify on fingerstick that your blood sugar is actually low before treating.   Eat 15 grams of carbs (see examples below)  Check your blood sugar after 15 minutes. If it’s still below your target range, have another serving.   Repeat these steps until it’s in your target range. Once it’s in range, if you're nervous about your sugar going low again, have a protein source (ie, a spoonful of peanut butter).   If you have a CGM you want to look for how your arrow has changed. If you arrow is pointed up or sideways after 15 min, give your CGM more time OR check with a finger stick. Try not to eat more food until at least 15 min after the first BG check - otherwise you risk having a rebound high.  If you are experiencing symptoms and you are unable to check your blood glucose for any reason, treat the hypoglycemia.  If someone has a low blood sugar and is unconscious: Don’t hesitate to call 911. If someone is  unconscious and glucagon is not available or someone does not know how to use it, call 911 immediately.    To treat a low, I recommend you carry with you easy, pre-portioned treatment for low blood sugars that are 15G of carbs:   - Children sized squeeze pouch applesauce (high fiber + carbs help prevent too high of a spike)  - Small children's sized juicebox- 15g carb --> 4oz juice box  - Glucose tablets from [email or web address], you can find them near diabetes supplies --> Note, you will need to eat 3-4 tablets to get to 15g of carbs  - Children sized fruit snack pack- look for one with 15 grams of total carbohydrate  - Choice of how to treat your low is important. Complex carbs, or foods that contain fats along with carbs (like chocolate) can slow the absorption of glucose and should NOT be used to treat an emergency low

## 2024-07-22 NOTE — PROGRESS NOTES
Return Office Visit     CHIEF COMPLAINT:    Chief Complaint   Patient presents with    Follow - Up     PT here for routine DM2 f/u, c/o spot on left foot on the second toe that has become bigger. Pt states that she addressed it with dermatologist but nothing was done.  Last HgA1c 8.8 on 3/19/24       Today's Date: 7/22/2024    HISTORY OF PRESENT ILLNESS:  Karey Pemberton is a 64 year old female with PMHx significant for IgA nephropathy (s/p transplant 2019, on tacrolimus and prednisone) who presents for follow up for hypoglycemia in the setting of post-transplant and DM2.     Initial consult: May 2022- w/ Dr. Audrey Lacey of type 2 diabetes mellitus x 10yrs  Underwent kidney transplant 3 years ago (2019) at [facility], follows with nephrologist ([facility]) and transplant surgeon ([facility]). Has been been on prednisone 5mg, cellcept and myfortic.  Pt feels that her BG has been more labile ever since she drank contrast for colonoscopy in mid-May and all the ED visits for hypoglycemia occurred after that.  Has been having frequent hypoglycemia since mid-May. Often at home has had BG 40-60s. Between 5/20-5/21/22, went to the ED 3x for hypoglycemia, with sx of sweating and itchiness. She took glucose tabs at home and BG was generally better by the time she was seen in the ED.      At initial endocrine visit in May 2022, due to declining GFR (25-32), had changed pt's oral DM meds from (Janumet: 50-1000mg BID, Glipizide 10mg BID) to glipizide 2.5mg BID, Sitagliptin 50mg daily, and metformin 500mg BID.     Interim hx:  - w/ Dr. Ventura  June 2022-  Due to hyperglycemia as a result of lowering oral DM meds in the setting of reduced GFR, had started pt on once daily Lantus 10U since 6/1/12. Her reported FBG that next morning as 122.  Lantus 10U at bedtime (pt has been experimenting with 6-10U), Metformin 500mg daily, Sitagliptin 50mg daily,  Glipizide 2.5mg BID    Oct 2022  Pt remains on lantus 8U daily,  metformin 500mg BID (max given GFR), ozempic 0.5mg, glipizide 2.5mg PO qAC BID  Enjoys Amy - is not scanning in the evenings and losing data  Increasing ozempic and DC'ing DIAZ    Jan 2023 1/2023 [facility] labs with GFR 40  DM regimen: lantus 8U daily, metformin 500mg BID, ozempic 1mg, glipizide 2.5mg qAC breakfast  Back on normal dose tacrolimus (BG was high when dose was doubled), saw nephrologist in Dec 2022  Pt experimented with evening glipizide and found it caused overnight hypoglycemia  Remains on prednisone 2.5mg  Unable to  Libre3  A1C 7% today; endorses dietary non-compliance during the holidays, is now back on track  Going on vacation to [place] in the spring    May 2023- w/ Dr. Ventura  Overall stable, no change to renal transplant care, still on prednisone daily  Started Libre3 recently, finds it more reliable  On trebisa 8U, metformin 500mg BID, ozempic 2mg weekly, glipizide 2.5mg qAC breakfast  Eats an early dinner and by next morning can feel brief GI upset, but overall tolerating ozempic. It has gotten very expensive with new insurance    Oct 2023- w/ Dr. Ventura  In-office A1C 7.6% today  Has had a lot of difficulty getting ozempic through [facility]; even if rx if written for 3 months, they can only  1 month at a time, and only find out about availability 5 days prior  Is going to [place] in December and stressed that she may have to go without rx  Tends to eat more carbs in the morning to settle her stomach    Dec 2023- w/ Dr. Ventura - Pt elected to switch from Trulicity to Rybelsus due to her having an extended trip to [place] and hard to keep medication refrigerated. Did well GI-wise on Rybelsus 3mg x 1 month and moved on to 7mg daily dose; however hasn't found it as efficacious as Ozempic; BG shoots up after meals and no appetite suppression, has gained some weight as well. Remains on lantus 6U and metformin 1g daily.  Also recently with R tooth infection/root canal that changed into  sinusitis, currently on ab  No changes from nephrologist    March 2024- w/ Dr. Ventura- Switched from Rybelsus back to ozempic after last visit, currently on ozempic 2mg weekly  Remains on lantus 6U and metformin 1g daily  Admits to dietary indiscretion while on vacation [place] and now enjoying fresh fruits; hard to sleep through the night and will snack overnight  No new nephrology updates ; outside labs in 2/2024 show GFR ~46    7/2024-w/ Karina VILLALPANDO.Last A1c value was 7.4% done 7/22/2024.  Notes chronic fatigue/insomnia-- she wakes up in the middle of the night, then sleeps from 2a-10a. Tries to exercise but is very tired.  Tried various sleep aids melatonin, has not helped.  Regarding diabetes: Blood sugars relatively stable, using [employer] 3 to check sugars, high co-pay for sensor  Current DM meds at visit: Ozempic 2 Mg weekly, Tresiba 7 u daily, metformin 500mg twice daily       Continuous Glucose Monitoring Interpretation  Karey Pemberton has undergone continuous glucose monitoring with their CGM.  The blood glucose tracings were evaluated for two weeks prior to office visit.   Blood glucose tracings demonstrated areas of hyperglycemia post-meal, particularly post-lunch/dinner-the patient notes that she is sleeping until noon sometimes  There were no areas of hypoglycemia noted.          Objective:   CURRENT MEDICATION:    Current Outpatient Medications   Medication Sig Dispense Refill    metFORMIN 500 MG Oral Tab Take 1 tablet (500 mg total) by mouth 2 (two) times daily with meals. 180 tablet 1    insulin degludec (TRESIBA FLEXTOUCH) 100 UNIT/ML Subcutaneous Solution Pen-injector Inject 10 Units into the skin daily.      Continuous Glucose Sensor (DEXCOM G7 SENSOR) Does not apply Misc 1 each Every 10 days. 9 each 1    glucagon (GVOKE HYPOPEN 2-PACK) 1 MG/0.2ML Subcutaneous injection Inject 0.2 mL (1 mg total) into the skin as needed.      predniSONE 2.5 MG Oral Tab Take 1 tablet (2.5 mg total) by mouth daily.       semaglutide (OZEMPIC, 2 MG/DOSE,) 8 MG/3ML Subcutaneous Solution Pen-injector Inject 2 mg into the skin once a week. 9 mL 0    Continuous Blood Gluc Sensor (FREESTYLE MARIVEL 3 SENSOR) Does not apply Misc 1 Device every 14 (fourteen) days. 6 each 3    Insulin Pen Needle (BD PEN NEEDLE SANDRA 2ND GEN) 32G X 4 MM Does not apply Misc Use daily for lantus injection 100 each 3    LISINOPRIL 5 MG Oral Tab TAKE 1 TABLET BY MOUTH EVERY DAY 90 tablet 0    carvedilol 25 MG Oral Tab Take 1 tablet (25 mg total) by mouth 2 (two) times daily with meals. 180 tablet 0    amLODIPine 5 MG Oral Tab Take 1 tablet (5 mg total) by mouth daily. (Patient taking differently: Take 1 tablet (5 mg total) by mouth every evening.) 90 tablet 0    atorvastatin 40 MG Oral Tab Take 1 tablet (40 mg total) by mouth nightly. 90 tablet 0    omega-3-acid ethyl esters 1 g Oral Cap Take 2 capsules (2 g total) by mouth 2 (two) times daily. 360 capsule 0    Glucose Blood ([employer] GLUCOSE METER TEST STRIPS) In Vitro Strip Please check glucose four times a day 500 strip 3    tacrolimus 1 MG Oral Cap Take 1 capsule (1 mg total) by mouth 2 (two) times daily.      mycophenolate mofetil 250 MG Oral Cap Take 1 capsule (250 mg total) by mouth 2 (two) times daily before meals.             ALLERGY:  Allergies   Allergen Reactions    Penicillin G Benzathine OTHER (SEE COMMENTS)     Site reaction with IM injection. Patient states no issues with amoxicillin or other antibiotics. 9/2/22 [facility]    Latex ITCHING         PAST MEDICAL, SOCIAL AND FAMILY HISTORY:  See past medical history marked as reviewed.  See past surgical history marked as reviewed.  See past family history marked as reviewed.  See past social history marked as reviewed.      REVIEW OF SYSTEMS:   Ten point review of systems has been performed and is otherwise negative and/or non-contributory, except as described above.      PHYSICAL EXAM:   Vitals:    07/22/24 1017   BP: 106/66   BP Location: Left arm   Patient  Position: Sitting   Cuff Size: adult   Pulse: 68   SpO2: 97%   Weight: 139 lb (63 kg)   Height: 5' (1.524 m)         BMI: Body mass index is 27.15 kg/m².     CONSTITUTIONAL:  awake, alert, cooperative, no apparent distress, and appears stated age  PSYCH: normal affect  LUNGS: breathing comfortably  CARDIOVASCULAR:  regular rate  Diabetes foot exam performed: Bilateral foot exam w/ normal skin temperature and turgor, +dry skin.  She has a healed wound on her second left toe.  Monofilament/sensation of bilateral feet is normal. Vibration to dorsum to the first toe perceived bilaterally. Bilateral dorsalis pedis +1. Capillary refill <3 seconds. Foot care practices reviewed with patient.      DATA:   n/a    Lab Results   Component Value Date    A1C 7.4 (A) 07/22/2024    A1C 8.8 (A) 03/19/2024    A1C 7.6 (A) 10/02/2023    A1C 7.0 (A) 01/31/2023    A1C 6.7 (A) 08/09/2022       Surveillance for Complications & Risks   Last A1c value was 7.4% done 7/22/2024.    CAD: none  Neuropathy/ Foot exam: Encouraged proper footwear and regular diabetic foot care.Last Foot Exam: 07/22/24    Ophtho: Last Dilated Eye Exam: 05/22/24   Eye Exam shows Diabetic Retinopathy?: No    Kidney health evaluation:  Stable, see care everywhere for updated labs  Component  Ref Range & Units 2/27/24  8:55 AM   CREATININE, RANDOM URINE - External  20 - 275 mg/dL 58   MICROALBUMIN - External  See Note: mg/dL 0.2   Comment: Reference Range:    Reference Range  Not established   MICROALBUMIN/CREATININE RATIO, RANDOM URINE - External  <30 mcg/mg creat 3          HTN: At goal  BP Readings from Last 1 Encounters:   07/22/24 106/66     Blood Pressure and Cardiac Medications            LISINOPRIL 5 MG Oral Tab    carvedilol 25 MG Oral Tab    amLODIPine 5 MG Oral Tab          Lipids: Stable, see Care Everywhere for updated labs  Component  Ref Range & Units 10/27/23 10:47 AM   Triglycerides  <=150 mg/dL 195 High    Direct HDL  >=40 mg/dL 34 Low    Comment:  Guidelines for high density lipoprotein (HDL) are adapted from the National Cholesterol Education Program (NCEP).Values >=40 mg/dL are considered a negative risk factor for coronary heart disease (CHD) and are considered protective.   Risk Factor  <5.0 3.6   Cholesterol  <=200 mg/dL 124   Calculated LDL  <=130 mg/dL 51   Total Chol/HDL Ratio  0.0 - 4.5 ratio 4   Calculated VLDL  <=30 mg/dL 39 High        Cholesterol Lowering Medications            atorvastatin 40 MG Oral Tab    omega-3-acid ethyl esters 1 g Oral Cap             Wt Readings from Last 6 Encounters:   07/22/24 139 lb (63 kg)   10/02/23 139 lb 12.8 oz (63.4 kg)   01/31/23 144 lb (65.3 kg)   09/04/22 147 lb 4.3 oz (66.8 kg)   08/09/22 152 lb (68.9 kg)   06/21/22 154 lb (69.9 kg)         Assessment & Plan:     1. Type 2 diabetes mellitus with diabetic nephropathy, without long-term current use of insulin ([facility])  Diagnosed w/ DM2 in ~2014. Hx of IgA nephropathy s/p renal transplant in 2019 at [facility]. Started insulin around 6/2022 with Dr. Ventura.  A1c incrementally improving, with today's A1c value 7.4% done 7/22/2024.     Limited non-insulin options given CKD3. Previously pt had severe and recurrent hypoglycemia on max dose DIAZ, and at increased risk for lactic acidosis with max dose metformin. Relative CI for SGLT2i given hx of cystitis. She was able to come off DIAZ after ramping up GLP1a, but availability/price is an issue. Pt currently on Tresiba, metformin (renal dose) and max dose ozempic. Will incr tresiba dose today.  Also will change over to Dexcom which has better coverage with her [employer] insurance.    - incr Tresiba 8u daily --> 10U daily; adjust prn for FBG   - continue metformin 500mg BID (renal dose)  - continue ozempic 2mg weekly  - relative CI for SGLT2i given hx of cystitis  - change leandra 3 --> dexcom G7; will need to be connected prior to next visit  - hyperlipidemia: LDL at goal, lipitor 40mg  - ophtho: no DR, [place]  Dec 2022  - nephro: IgA nephropathy, on ACEi, prednisone, tacrolimus   - neuropathy: denies  - refills sent to respective pharmacies    2. Stage 3b chronic kidney disease (HCC)  - continue f/u with nephrologist  - GFR stable 7/22/2024     3. Chronic fatigue  Reviewed that fatigue is often multifactorial and we have reviewed the common etiologies, both endocrine and non-endocrine.  - repeat TFTs today  - no anemia per CBC   - pt denies snoring/DELMER   - vit D-decr, previously stable  - Discussed good sleep hygiene habits, including reduce screen time at night, reduce caffeine intake  - vit B12 - repeat given metformin use  - reviewed if all above normal, pt will continue to follow up with PCP        Return in about 3 months (around 10/22/2024) for DM follow up.  Prev Dr. Ventura patient, discussed will follow up with APN until Dr Ventura returns ~Jan 2025    A total of 41 minutes was spent today on obtaining history, reviewing pertinent labs, evaluating patient, providing multiple treatment options, reinforcing diet/exercise and compliance, and completing documentation.       KWASI Mitchell  7/22/2024

## 2024-08-01 LAB
T4, FREE: 1.3 NG/DL (ref 0.8–1.8)
TSH: 1.98 MIU/L (ref 0.4–4.5)
VITAMIN B12: 261 PG/ML (ref 200–1100)

## 2024-08-02 ENCOUNTER — TELEPHONE (OUTPATIENT)
Facility: CLINIC | Age: 64
End: 2024-08-02

## 2024-08-02 DIAGNOSIS — R53.82 CHRONIC FATIGUE: ICD-10-CM

## 2024-08-02 DIAGNOSIS — E11.21 TYPE 2 DIABETES MELLITUS WITH DIABETIC NEPHROPATHY, WITHOUT LONG-TERM CURRENT USE OF INSULIN (HCC): Primary | ICD-10-CM

## 2024-08-02 NOTE — TELEPHONE ENCOUNTER
Lab result note: Pls tell pt: thyroid levels normal, Vit B12 is in the lower end of normal, to start taking Vit B12 OTC at least 500 mcg per day, no more than 2000 mcg per day.     MCM sent to patient to address above.

## 2024-08-12 ENCOUNTER — TELEPHONE (OUTPATIENT)
Facility: CLINIC | Age: 64
End: 2024-08-12

## 2024-08-12 DIAGNOSIS — E11.21 TYPE 2 DIABETES MELLITUS WITH DIABETIC NEPHROPATHY, WITHOUT LONG-TERM CURRENT USE OF INSULIN (HCC): ICD-10-CM

## 2024-08-12 RX ORDER — SEMAGLUTIDE 2.68 MG/ML
2 INJECTION, SOLUTION SUBCUTANEOUS WEEKLY
Qty: 9 ML | Refills: 0 | Status: SHIPPED | OUTPATIENT
Start: 2024-08-12

## 2024-08-12 NOTE — TELEPHONE ENCOUNTER
Received fax back from [employer] [facility] stating that authorization for Ozempic is already in place and effective through 12/31/24.    Phoned pharmacy to discuss- states they are actually in need of refill, not PA.    Endocrine Refill protocol for oral and injectable diabetic medications    Protocol Criteria:  PASSED    -Appointment with Endocrinology completed in the last 6 months or scheduled in the next 3 months    -A1c result below 8.5% in the past 6 months      Verify the above has been completed or scheduled in the appropriate timeline. If so can send a 90 day supply with 1 refill.     Last completed office visit: 7/22/2024 Cah Norris APN   Next scheduled Follow up:   Future Appointments   Date Time Provider Department Center   10/22/2024 10:00 AM Cha Norris AP[facility]      Last A1c result: Last A1c value was 7.4% done 7/22/2024.    Per LOV 7/22/24 with Karina VILLALPANDO,   \"- continue ozempic 2mg weekly.\"    Refill protocol passed, 90 day supply sent to pharmacy.

## 2024-08-12 NOTE — TELEPHONE ENCOUNTER
Prior authorization renewal completed on Cover My Medications for Ozempic (2mg/dose) 8mg/3ml pens.  Key: [id number]     Original fax sent per Joshua sent to mihaela.    Awaiting determination.

## 2024-08-12 NOTE — TELEPHONE ENCOUNTER
8/12/24-[facility] for [facility] renewal of Ozempic as current PA expires 8/23/24.  Placed in PA in basket.

## 2024-08-26 NOTE — TELEPHONE ENCOUNTER
Patient phoned back and verbalized understanding.    States with her Dexcom, her numbers are always higher than her glucometer.    RN reviewed why these numbers tend to differentiate from one another.    Mychart sent to get patient connected in Clarity.

## 2024-09-25 LAB
AMB EXT CREATININE, URINE: 49 MG/DL
AMB EXT EGFR NON-AA: 47
AMB EXT MALB URINE CALC: 0 MG/24HR
AMB EXT MALB/CRE CALC: 4 UG/MG

## 2024-10-07 ENCOUNTER — TELEPHONE (OUTPATIENT)
Facility: CLINIC | Age: 64
End: 2024-10-07

## 2024-10-07 DIAGNOSIS — E11.21 TYPE 2 DIABETES MELLITUS WITH DIABETIC NEPHROPATHY, WITHOUT LONG-TERM CURRENT USE OF INSULIN (HCC): ICD-10-CM

## 2024-10-07 NOTE — TELEPHONE ENCOUNTER
10/7/24-[facility] Ref[facility] for Tresiba flextouch pen (U-100) inj 3ml.  Placed in PA in basket.

## 2024-10-08 RX ORDER — INSULIN DEGLUDEC 100 U/ML
INJECTION, SOLUTION SUBCUTANEOUS
Qty: 9 ML | Refills: 1 | Status: SHIPPED | OUTPATIENT
Start: 2024-10-08

## 2024-10-08 NOTE — TELEPHONE ENCOUNTER
Endocrine refill protocol for basal insulins     Protocol Criteria: PASSED Reason: N/A    If all below requirements are met, send a 90-day supply with 1 refill per provider protocol.       Verify Appointment with Endocrinology completed in the last 6 months or scheduled in the next 3 months.  Verify A1C has been completed within the last 6 months and is below 8.5%     Last completed office visit:7/22/2024 Cha Norris APN   Next scheduled Follow up:   Future Appointments   Date Time Provider Department Center   10/22/2024 10:00 AM Cha Norris AP[facility]      Last A1c result: Last A1c value was 7.4% done 7/22/2024.     Last office note: - incr Tresiba 8u daily --> 10U daily; adjust prn for FBG      Ordered per protocol

## 2024-10-22 ENCOUNTER — OFFICE VISIT (OUTPATIENT)
Facility: CLINIC | Age: 64
End: 2024-10-22
Payer: COMMERCIAL

## 2024-10-22 ENCOUNTER — TELEPHONE (OUTPATIENT)
Facility: CLINIC | Age: 64
End: 2024-10-22

## 2024-10-22 VITALS
HEIGHT: 60 IN | WEIGHT: 139.19 LBS | BODY MASS INDEX: 27.33 KG/M2 | HEART RATE: 79 BPM | OXYGEN SATURATION: 97 % | SYSTOLIC BLOOD PRESSURE: 110 MMHG | DIASTOLIC BLOOD PRESSURE: 62 MMHG

## 2024-10-22 DIAGNOSIS — I15.2 HYPERTENSION ASSOCIATED WITH TYPE 2 DIABETES MELLITUS (HCC): ICD-10-CM

## 2024-10-22 DIAGNOSIS — E11.69 HYPERLIPIDEMIA ASSOCIATED WITH TYPE 2 DIABETES MELLITUS (HCC): ICD-10-CM

## 2024-10-22 DIAGNOSIS — E11.21 TYPE 2 DIABETES MELLITUS WITH DIABETIC NEPHROPATHY, WITHOUT LONG-TERM CURRENT USE OF INSULIN (HCC): Primary | ICD-10-CM

## 2024-10-22 DIAGNOSIS — E11.59 HYPERTENSION ASSOCIATED WITH TYPE 2 DIABETES MELLITUS (HCC): ICD-10-CM

## 2024-10-22 DIAGNOSIS — E78.5 HYPERLIPIDEMIA ASSOCIATED WITH TYPE 2 DIABETES MELLITUS (HCC): ICD-10-CM

## 2024-10-22 DIAGNOSIS — Z94.0 S/P KIDNEY TRANSPLANT (HCC): ICD-10-CM

## 2024-10-22 LAB — HEMOGLOBIN A1C: 7.1 % (ref 4.3–5.6)

## 2024-10-22 PROCEDURE — 83036 HEMOGLOBIN GLYCOSYLATED A1C: CPT

## 2024-10-22 PROCEDURE — 3051F HG A1C>EQUAL 7.0%<8.0%: CPT

## 2024-10-22 PROCEDURE — 95251 CONT GLUC MNTR ANALYSIS I&R: CPT

## 2024-10-22 PROCEDURE — 3078F DIAST BP <80 MM HG: CPT

## 2024-10-22 PROCEDURE — 3074F SYST BP LT 130 MM HG: CPT

## 2024-10-22 PROCEDURE — 99215 OFFICE O/P EST HI 40 MIN: CPT

## 2024-10-22 PROCEDURE — 3008F BODY MASS INDEX DOCD: CPT

## 2024-10-22 RX ORDER — INSULIN DEGLUDEC 100 U/ML
INJECTION, SOLUTION SUBCUTANEOUS
COMMUNITY
Start: 2024-10-22

## 2024-10-22 RX ORDER — BLOOD SUGAR DIAGNOSTIC
STRIP MISCELLANEOUS
Qty: 200 STRIP | Refills: 3 | Status: SHIPPED | OUTPATIENT
Start: 2024-10-22 | End: 2025-10-22

## 2024-10-22 RX ORDER — ACYCLOVIR 400 MG/1
1 TABLET ORAL
Qty: 9 EACH | Refills: 1 | Status: SHIPPED | OUTPATIENT
Start: 2024-10-22

## 2024-10-22 RX ORDER — SEMAGLUTIDE 2.68 MG/ML
2 INJECTION, SOLUTION SUBCUTANEOUS WEEKLY
Qty: 9 ML | Refills: 1 | Status: SHIPPED | OUTPATIENT
Start: 2024-10-22

## 2024-10-22 RX ORDER — PEN NEEDLE, DIABETIC 32GX 5/32"
NEEDLE, DISPOSABLE MISCELLANEOUS
Qty: 100 EACH | Refills: 3 | Status: SHIPPED | OUTPATIENT
Start: 2024-10-22

## 2024-10-22 NOTE — TELEPHONE ENCOUNTER
10/22/24-Received via fax from [facility] a PA for Ozempic 2mg per dose (8mg/3ml).  Placed in PA in basket.

## 2024-10-22 NOTE — PROGRESS NOTES
[facility]    Name: Karey Pemberton    Date: 10/22/2024    Karey Pemberton is a 64 year old female who presents for evaluation of T2DM management.     Chief complaint: Follow - Up (Patient presents for DM follow up. Dexcom G7 to check blood glucose. States is usually inaccurate. Repeat A1C completed in clinic. )       Subjective:   Initial consult: May 2022- w/ Dr. Ventura  Hx of type 2 diabetes mellitus x 10yrs  Underwent kidney transplant 3 years ago (2019) at [facility], follows with nephrologist ([facility]) and transplant surgeon ([facility]). Has been been on prednisone 5mg, cellcept and myfortic.  Pt feels that her BG has been more labile ever since she drank contrast for colonoscopy in mid-May and all the ED visits for hypoglycemia occurred after that.  Has been having frequent hypoglycemia since mid-May. Often at home has had BG 40-60s. Between 5/20-5/21/22, went to the ED 3x for hypoglycemia, with sx of sweating and itchiness. She took glucose tabs at home and BG was generally better by the time she was seen in the ED.      At initial endocrine visit in May 2022, due to declining GFR (25-32), had changed pt's oral DM meds from (Janumet: 50-1000mg BID, Glipizide 10mg BID) to glipizide 2.5mg BID, Sitagliptin 50mg daily, and metformin 500mg BID.      Interim hx:  - w/ Dr. Ventura  June 2022-  Due to hyperglycemia as a result of lowering oral DM meds in the setting of reduced GFR, had started pt on once daily Lantus 10U since 6/1/12. Her reported FBG that next morning as 122.  Lantus 10U at bedtime (pt has been experimenting with 6-10U), Metformin 500mg daily, Sitagliptin 50mg daily,  Glipizide 2.5mg BID     Oct 2022  Pt remains on lantus 8U daily, metformin 500mg BID (max given GFR), ozempic 0.5mg, glipizide 2.5mg PO qAC BID  Enjoys Amy - is not scanning in the evenings and losing data  Increasing ozempic and DC'ing DIAZ     Jan 2023 1/2023 [facility] labs with GFR 40  DM  regimen: lantus 8U daily, metformin 500mg BID, ozempic 1mg, glipizide 2.5mg qAC breakfast  Back on normal dose tacrolimus (BG was high when dose was doubled), saw nephrologist in Dec 2022  Pt experimented with evening glipizide and found it caused overnight hypoglycemia  Remains on prednisone 2.5mg  Unable to  Libre3  A1C 7% today; endorses dietary non-compliance during the holidays, is now back on track  Going on vacation to [facility] in the spring     May 2023- w/ Dr. Ventura  Overall stable, no change to renal transplant care, still on prednisone daily  Started Libre3 recently, finds it more reliable  On trebisa 8U, metformin 500mg BID, ozempic 2mg weekly, glipizide 2.5mg qAC breakfast  Eats an early dinner and by next morning can feel brief GI upset, but overall tolerating ozempic. It has gotten very expensive with new insurance     Oct 2023- w/ Dr. Ventura  In-office A1C 7.6% today  Has had a lot of difficulty getting ozempic through [facility]; even if rx if written for 3 months, they can only  1 month at a time, and only find out about availability 5 days prior  Is going to [facility] in December and stressed that she may have to go without rx  Tends to eat more carbs in the morning to settle her stomach     Dec 2023- w/ Dr. Ventura - Pt elected to switch from Trulicity to Rybelsus due to her having an extended trip to [facility] and hard to keep medication refrigerated. Did well GI-wise on Rybelsus 3mg x 1 month and moved on to 7mg daily dose; however hasn't found it as efficacious as Ozempic; BG shoots up after meals and no appetite suppression, has gained some weight as well. Remains on lantus 6U and metformin 1g daily.  Also recently with R tooth infection/root canal that changed into sinusitis, currently on ab  No changes from nephrologist     March 2024- w/ Dr. Ventura- Switched from Rybelsus back to ozempic after last visit, currently on ozempic 2mg weekly  Remains on lantus 6U and metformin 1g daily  Admits to  dietary indiscretion while on vacation [facility] and now enjoying fresh fruits; hard to sleep through the night and will snack overnight  No new nephrology updates ; outside labs in 2/2024 show GFR ~46     7/2024-w/ Karina VILLALPANDO.Last A1c value was 7.4% done 7/22/2024.  Notes chronic fatigue/insomnia-- she wakes up in the middle of the night, then sleeps from 2a-10a. Tries to exercise but is very tired.  Tried various sleep aids melatonin, has not helped.  Regarding diabetes: Blood sugars relatively stable, using leandra 3 to check sugars, high co-pay for sensor    10/22/2024-w/ Karina VILLALPANDO.Last A1c value was 7.1% done 10/22/2024.  Last office visit, adjusted tresiba to FBG goal .  Also switched to dexcom to inquire re: pricing  Noting more inaccuracies with her dexcom - having to calibrate often    Eats leftovers (rice/meat, sometimes fruit)  around 10am or so which leads to spike of BG  Going to [facility] Nov - March to visit her mother      Current DM meds at visit: Ozempic 2mg weekly, Tresiba 10u daily, metformin 500mg twice daily    Continuous Glucose Monitoring Interpretation  Karey Pemberton has undergone continuous glucose monitoring with their CGM.  The blood glucose tracings were evaluated for two weeks prior to office visit.   Blood glucose tracings demonstrated areas of hyperglycemia from 11a to 4p. There were no areas of hypoglycemia noted.        History/Other:    Allergies, PMH, SocHx and FHx reviewed and updated as appropriate in Epic on    Glucose Blood (ONETOUCH VERIO) In Vitro Strip Use to test blood sugar 2 times per day 200 strip 3    insulin degludec (TRESIBA FLEXTOUCH) 100 UNIT/ML Subcutaneous Solution Pen-injector Inject 12 units daily      semaglutide (OZEMPIC, 2 MG/DOSE,) 8 MG/3ML Subcutaneous Solution Pen-injector Inject 2 mg into the skin once a week. 9 mL 1    Continuous Glucose Sensor (DEXCOM G7 SENSOR) Does not apply Misc 1 each Every 10 days. 9 each 1    Insulin Pen Needle (BD PEN NEEDLE SANDRA  2ND GEN) 32G X 4 MM Does not apply Misc Use daily for lantus injection 100 each 3    metFORMIN 500 MG Oral Tab Take 1 tablet (500 mg total) by mouth 2 (two) times daily with meals. 180 tablet 1    glucagon (GVOKE HYPOPEN 2-PACK) 1 MG/0.2ML Subcutaneous injection Inject 0.2 mL (1 mg total) into the skin as needed.      predniSONE 2.5 MG Oral Tab Take 1 tablet (2.5 mg total) by mouth daily.      LISINOPRIL 5 MG Oral Tab TAKE 1 TABLET BY MOUTH EVERY DAY 90 tablet 0    carvedilol 25 MG Oral Tab Take 1 tablet (25 mg total) by mouth 2 (two) times daily with meals. 180 tablet 0    amLODIPine 5 MG Oral Tab Take 1 tablet (5 mg total) by mouth daily. (Patient taking differently: Take 1 tablet (5 mg total) by mouth every evening.) 90 tablet 0    atorvastatin 40 MG Oral Tab Take 1 tablet (40 mg total) by mouth nightly. 90 tablet 0    omega-3-acid ethyl esters 1 g Oral Cap Take 2 capsules (2 g total) by mouth 2 (two) times daily. 360 capsule 0    tacrolimus 1 MG Oral Cap Take 1 capsule (1 mg total) by mouth 2 (two) times daily.      mycophenolate mofetil 250 MG Oral Cap Take 1 capsule (250 mg total) by mouth 2 (two) times daily before meals.       Allergies[1]  Current Outpatient Medications   Medication Sig Dispense Refill    Glucose Blood (ONETOUCH VERIO) In Vitro Strip Use to test blood sugar 2 times per day 200 strip 3    insulin degludec (TRESIBA FLEXTOUCH) 100 UNIT/ML Subcutaneous Solution Pen-injector Inject 12 units daily      semaglutide (OZEMPIC, 2 MG/DOSE,) 8 MG/3ML Subcutaneous Solution Pen-injector Inject 2 mg into the skin once a week. 9 mL 1    Continuous Glucose Sensor (DEXCOM G7 SENSOR) Does not apply Misc 1 each Every 10 days. 9 each 1    Insulin Pen Needle (BD PEN NEEDLE SANDRA 2ND GEN) 32G X 4 MM Does not apply Misc Use daily for lantus injection 100 each 3    metFORMIN 500 MG Oral Tab Take 1 tablet (500 mg total) by mouth 2 (two) times daily with meals. 180 tablet 1    glucagon (GVOKE HYPOPEN 2-PACK) 1  MG/0.2ML Subcutaneous injection Inject 0.2 mL (1 mg total) into the skin as needed.      predniSONE 2.5 MG Oral Tab Take 1 tablet (2.5 mg total) by mouth daily.      LISINOPRIL 5 MG Oral Tab TAKE 1 TABLET BY MOUTH EVERY DAY 90 tablet 0    carvedilol 25 MG Oral Tab Take 1 tablet (25 mg total) by mouth 2 (two) times daily with meals. 180 tablet 0    amLODIPine 5 MG Oral Tab Take 1 tablet (5 mg total) by mouth daily. (Patient taking differently: Take 1 tablet (5 mg total) by mouth every evening.) 90 tablet 0    atorvastatin 40 MG Oral Tab Take 1 tablet (40 mg total) by mouth nightly. 90 tablet 0    omega-3-acid ethyl esters 1 g Oral Cap Take 2 capsules (2 g total) by mouth 2 (two) times daily. 360 capsule 0    tacrolimus 1 MG Oral Cap Take 1 capsule (1 mg total) by mouth 2 (two) times daily.      mycophenolate mofetil 250 MG Oral Cap Take 1 capsule (250 mg total) by mouth 2 (two) times daily before meals.       Past Medical History:    Allergic rhinitis    Diabetes (HCC)    Diabetes mellitus (HCC)    ESRD (end stage renal disease) on dialysis ([facility])    s/p kidney transplant [facility].  renal: Dr. Osorio    GERD (gastroesophageal reflux disease)    High blood pressure    High cholesterol    Hyperlipidemia    Hypertension    IgA nephropathy    nephrotic range proteinuria    Obesity    Renal disorder    Vitamin D deficiency     Family History   Problem Relation Age of Onset    Hypertension Mother     Diabetes Mother     Cancer Father     Diabetes Brother     Diabetes Other      Social history: Reviewed.      ROS/Exam    REVIEW OF SYSTEMS: Ten point review of systems has been performed and is otherwise negative and/or non-contributory, except as described above.     VITALS  Vitals:    10/22/24 0959   BP: 110/62   BP Location: Right arm   Patient Position: Sitting   Cuff Size: adult   Pulse: 79   SpO2: 97%   Weight: 139 lb 3.2 oz (63.1 kg)   Height: 5' (1.524 m)       Wt Readings from Last 6 Encounters:   10/22/24 139 lb 3.2 oz  (63.1 kg)   07/22/24 139 lb (63 kg)   10/02/23 139 lb 12.8 oz (63.4 kg)   01/31/23 144 lb (65.3 kg)   09/04/22 147 lb 4.3 oz (66.8 kg)   08/09/22 152 lb (68.9 kg)       PHYSICAL EXAM  CONSTITUTIONAL:  awake, alert, cooperative, no apparent distress, and appears stated age   PSYCH: normal affect  LUNGS: breathing comfortably  CARDIOVASCULAR:  regular rate     Labs/Imaging: Pertinent imaging reviewed.    Overall glucose control:  Lab Results   Component Value Date    A1C 7.1 (A) 10/22/2024    A1C 7.4 (A) 07/22/2024    A1C 8.8 (A) 03/19/2024    A1C 7.6 (A) 10/02/2023    A1C 7.0 (A) 01/31/2023       Supplementary Documentation:   -Surveillance for Diabetes Complications & Risks  Foot exam/neuropathy: Last Foot Exam: 07/22/24    Retinopathy screening: Last Dilated Eye Exam: 05/22/24  Eye Exam shows Diabetic Retinopathy?: No      Assessment & Plan:     ICD-10-CM    1. Type 2 diabetes mellitus with diabetic nephropathy, without long-term current use of insulin ([facility])  E11.21 POC Hemoglobin A1C     insulin degludec (TRESIBA FLEXTOUCH) 100 UNIT/ML Subcutaneous Solution Pen-injector     semaglutide (OZEMPIC, 2 MG/DOSE,) 8 MG/3ML Subcutaneous Solution Pen-injector      2. Hypertension associated with type 2 diabetes mellitus ([facility])  E11.59     I15.2       3. Hyperlipidemia associated with type 2 diabetes mellitus ([facility])  E11.69     E78.5       4. S/P kidney transplant ([facility])  Z94.0           Karey Pemberton is a pleasant 64 year old female here for evaluation of:    #Type 2 diabetes mellitus with diabetic nephropathy, without long-term current use of insulin   Diagnosed w/ DM2 in ~2014. Hx of IgA nephropathy s/p renal transplant in 2019 at [facility]. Started insulin around 6/2022 with Dr. Ventura.  A1c incrementally improving, with today's 7.1% done 10/22/2024. Goal <7%.     Limited non-insulin options given CKD3. Previously pt had severe and recurrent hypoglycemia on max dose DIAZ, and at increased risk for lactic acidosis with max  dose metformin. Relative CI for SGLT2i given hx of cystitis. She was able to come off DIAZ after ramping up GLP1a, but availability/price is an issue. Pt currently on Tresiba, metformin (renal dose) and max dose ozempic. Will incr tresiba dose today. Reviewed dietary habit changes that may help with mid afternoon spike (add in some extra protein, fiber or healthy fats)        Importance of better glucose control in preventing onset/progression of end-organ damage discussed, as well as goals of therapy and clinical significance of A1C.     - med changes:  - incr tresiba 10u --> 12u daily  - continue ozempic 2mg weekly  - continue metformin 500mg twice daily  (renal dosing given fluctuating GFR)  - continue dexcom G7       - reviewed when to calibrate dexcom  - check BG 2x/day, test strips re-sent  - travel letter provided   - relative CI for SGLT2i given hx of cystitis  - hypoglycemic with DIAZ, better control on insulin   - Discussed management of low glucose and provided instructions in AVS   - See above header \"Supplementary Documentation\" for surveillance for diabetes complications & risks    #Hx of CKD s/p renal transplant  GFR on recent labs with transplant team 9/2024- 47  - taking ACEi  - continue follow up with nephrologist (Dr. Jaimes with [facility])   Lab Results   Component Value Date    EGFRCR 46 (L) 09/04/2022    MICROALBCREA  03/19/2022      Comment:      .  Unable to calculate due to Urine Microalbumin <1.2 mg/dL        #Hypertension  - SBP is to goal <130   BP Readings from Last 1 Encounters:   10/22/24 110/62   BP Meds: amLODIPine Tabs - 5 MG; carvedilol Tabs - 25 MG; lisinopril Tabs - 5 MG     #Hyperlipidemia  - LDL is to goal   Lab Results   Component Value Date    LDL 40 03/19/2022    TRIG 349.00 (H) 03/19/2022   Cholesterol Meds: atorvastatin Tabs - 40 MG; omega-3-acid ethyl esters Caps - 1 g        #Chronic fatigue  Evaluated labs for chronic fatigue. 8/2024- TFTs stable, vit D 64, B12 was low end normal  (261).  - follow up with PCP re: B12 dosing  - vit D stable  - no anemia present on CBC  - TFTs WNL 8/2024    Return in about 6 months (around 4/22/2025). Patient will be in [place] for ~4mo and returns in March. Will plan to follow up at that point.   Prev Dr. Ventura patient, discussed will follow up with APN until Dr Ventura returns ~Jan 2025    A total of 42 minutes was spent today on obtaining history, reviewing pertinent labs, evaluating patient, providing multiple treatment options, reinforcing diet/exercise and compliance, and completing documentation.       KWASI Mitchell  Endocrinology, Diabetes & Metabolism   10/22/2024    Note to patient: The 21 Century Cures Act makes medical notes like these available to patients in the interest of transparency. However, be advised this is a medical document. It is intended as peer to peer communication. It is written in medical language and may contain abbreviations or verbiage that are unfamiliar. It may appear blunt or direct. Medical documents are intended to carry relevant information, facts as evident, and the clinical opinion of the practitioner.         [1]   Allergies  Allergen Reactions    Penicillin G Benzathine OTHER (SEE COMMENTS)     Site reaction with IM injection. Patient states no issues with amoxicillin or other antibiotics. 9/2/22  RPh    Latex ITCHING

## 2024-10-22 NOTE — PATIENT INSTRUCTIONS
Follow up plan:  With KWASI Mitchell: Return in about 23 weeks (around 4/1/2025).    Start Taking               Glucose Blood (ONETOUCH VERIO) In Vitro Strip Use to test blood sugar 2 times per day          These Medications Have Changed       Start Taking Instead of    insulin degludec (TRESIBA FLEXTOUCH) 100 UNIT/ML Subcutaneous Solution Pen-injector insulin degludec (TRESIBA FLEXTOUCH) 100 UNIT/ML Subcutaneous Solution Pen-injector    Dosage:  Inject 12 units daily Dosage:  Inject 10 units daily            - incr tresiba 10u --> 12u daily  - continue ozempic 2mg weekly  - continue metformin 500mg twice daily   - continue dexcom G7     The three times you should double check the Dexcom are:  If it's reading low and you don't FEEL low  There is no arrow showing with your number  Your BG reads the word HIGH on the Dexcom for over an hour     In each of these cases we are worried about connectivity and the accuracy of the device may be compromised, so calibrating would be appropriate.  Otherwise, it's best to avoid calibrating the G7, as over calibrating can also cause issues with the sensor.      Additionally, if you get a blood sugar reading that is greater than 50 points above or below what your CGM is reading, you should calibrate your sensor.  With Dexcom G6 you will select \"settings\" at the bottom of the screen and then click \"calibrate\", enter your reading from your glucose meter twice for best results.  With Dexcom G7 you will click the + sign at the top of the screen and choose \"enter blood glucose\" and then \"calibration\", enter the glucose from your meter once to calibrate.      If any of those 3 issues were to persist, it would be best for you to remove the sensor, place a new one and call Dexcom to report the faulty sensor.  They will then send you a replacement for free.    Updated/current diabetes medication instructions:  Diabetic Medications               insulin degludec (TRESIBA FLEXTOUCH)  100 UNIT/ML Subcutaneous Solution Pen-injector (Taking) Inject 12 units daily    semaglutide (OZEMPIC, 2 MG/DOSE,) 8 MG/3ML Subcutaneous Solution Pen-injector (Taking) Inject 2 mg into the skin once a week.    metFORMIN 500 MG Oral Tab (Taking) Take 1 tablet (500 mg total) by mouth 2 (two) times daily with meals.    glucagon (GVOKE HYPOPEN 2-PACK) 1 MG/0.2ML Subcutaneous injection (Taking As Needed) Inject 0.2 mL (1 mg total) into the skin as needed.            Office phone number: [phone]; phones are open Monday-Friday 8:30-4:30.   Thank you for visiting our office. We look forward to working with you to reach your health goals. As a reminder, if you need refills, please request early so there is enough time to process the request. We ask that you provide at least 5 days' notice before a refill is due, so there is time to send a request to pharmacy, process the refill, and ensure there are no other problems with obtaining the medication (backorders, prior authorization paperwork, etc). Routine refills will not be addressed on weekends, so please submit these requests during the week.    Blood sugar targets:  Before breakfast: , 2 hours after meals: <180 (preferably <150), A1C goal: <7.0%  Time in Range goal is higher than 80% if using a continuous glucose monitor (Dexcom or Amy).  Time in Range can be found within the Dexcom G7 max, on Clarity if using Dexcom G6, or on LibreView if using Amy.    HOW TO TREAT LOW BLOOD SUGAR (Hypoglycemia)  Low blood sugar= Less than 70, or if you start to have symptoms (below)  Symptoms: Shaking or trembling, fast heart rate, extreme hunger, sweating, confusion/difficulty concentrating, dizziness.    How to treat a low blood sugar if you are able to eat/drink: The Rule of 15  If you are using continuous glucose monitor that says you are low, but you do not have any symptoms, verify on fingerstick that your blood sugar is actually low before treating.   Eat 15 grams  of carbs (see examples below)  Check your blood sugar after 15 minutes. If it’s still below your target range, have another serving.   Repeat these steps until it’s in your target range. Once it’s in range, if you're nervous about your sugar going low again, have a protein source (ie, a spoonful of peanut butter).   If you have a CGM you want to look for how your arrow has changed. If you arrow is pointed up or sideways after 15 min, give your CGM more time OR check with a finger stick. Try not to eat more food until at least 15 min after the first BG check - otherwise you risk having a rebound high.  If you are experiencing symptoms and you are unable to check your blood glucose for any reason, treat the hypoglycemia.  If someone has a low blood sugar and is unconscious: Don’t hesitate to call 911. If someone is unconscious and glucagon is not available or someone does not know how to use it, call 911 immediately.    To treat a low, I recommend you carry with you easy, pre-portioned treatment for low blood sugars that are 15G of carbs:   - Children sized squeeze pouch applesauce (high fiber + carbs help prevent too high of a spike)  - Small children's sized juicebox- 15g carb --> 4oz juice box  - Glucose tablets from [employer], you can find them near diabetes supplies --> Note, you will need to eat 3-4 tablets to get to 15g of carbs  - Children sized fruit snack pack- look for one with 15 grams of total carbohydrate  - Choice of how to treat your low is important. Complex carbs, or foods that contain fats along with carbs (like chocolate) can slow the absorption of glucose and should NOT be used to treat an emergency low

## 2024-10-23 NOTE — TELEPHONE ENCOUNTER
Forms received and completed. Faxed forms and LOV notes to [employer] at [phone]. Will await confirmation and determination.

## 2024-10-24 NOTE — TELEPHONE ENCOUNTER
10/24/24-Received via fax from [employer] approval for Ozempic 2mg per dose (8mg/3ml) effective 9/24/24-10/24/25.   Sent to scan.

## 2024-12-27 DIAGNOSIS — E11.21 TYPE 2 DIABETES MELLITUS WITH DIABETIC NEPHROPATHY, WITHOUT LONG-TERM CURRENT USE OF INSULIN (HCC): ICD-10-CM

## 2024-12-27 RX ORDER — INSULIN DEGLUDEC 100 U/ML
INJECTION, SOLUTION SUBCUTANEOUS
Qty: 12 ML | Refills: 1 | Status: SHIPPED | OUTPATIENT
Start: 2024-12-27

## 2024-12-27 NOTE — TELEPHONE ENCOUNTER
Endocrine refill protocol for basal insulins     Protocol Criteria: PASSED Reason: N/A    If all below requirements are met, send a 90-day supply with 1 refill per provider protocol.       Verify Appointment with Endocrinology completed in the last 6 months or scheduled in the next 3 months.  Verify A1C has been completed within the last 6 months and is below 8.5%     Last completed office visit:10/22/2024 Cha Jose AP[facility]   Next scheduled Follow up:   Future Appointments   Date Time Provider Department Center   4/15/2025 10:45 AM Cha Jose APN [facility]      Last A1c result: Last A1c value was 7.1% done 10/22/2024.     Last office note: - incr tresiba 10u --> 12u daily     Passed and ordered per protocol

## 2025-01-15 DIAGNOSIS — E11.21 TYPE 2 DIABETES MELLITUS WITH DIABETIC NEPHROPATHY, WITHOUT LONG-TERM CURRENT USE OF INSULIN (HCC): ICD-10-CM

## 2025-01-16 NOTE — TELEPHONE ENCOUNTER
Endocrine Refill protocol for metformin    Protocol Criteria:  PASSED  Reason: N/A    If all below requirements are met, send a 90-day supply with 1 refill per provider protocol.     Verify appointment with Endocrinology completed in the last 6 months or scheduled in the next 3 months.  Verify A1C has been completed within the last 6 months and is below 8.5%  Verify last GFR is greater than or equal to 40 in the past 12 months    Last completed office visit:10/22/2024 Cha Jose AP[facility]   Next scheduled Follow up:   Future Appointments   Date Time Provider Department Center   4/15/2025 10:45 AM Cha Jose [facility]       Last GFR result:    Lab Results   Component Value Date    EGFRCR 46 (L) 09/04/2022     Last A1c result: Last A1C result: 7.1% done 10/22/2024.    Last GFR: 47 on date 9/24/24

## 2025-01-17 NOTE — TELEPHONE ENCOUNTER
Refill sent. Ext results labs reviewed- GFR stable  Did order repeat tests for follow up appointment to [facility]     Will close encounter.

## 2025-02-06 DIAGNOSIS — E11.21 TYPE 2 DIABETES MELLITUS WITH DIABETIC NEPHROPATHY, WITHOUT LONG-TERM CURRENT USE OF INSULIN (HCC): Primary | ICD-10-CM

## 2025-02-06 RX ORDER — ACYCLOVIR 400 MG/1
1 TABLET ORAL
Qty: 9 EACH | Refills: 1 | Status: SHIPPED | OUTPATIENT
Start: 2025-02-06

## 2025-02-06 NOTE — TELEPHONE ENCOUNTER
Endocrine Refill protocol for CGM supplies     Protocol Criteria:  PASSED Reason: N/A    If below requirement is met, send a 90-day supply with 1 refill per provider protocol.     Verify appointment with Endocrinology completed in the last 12 months or scheduled in the next 6 months     Last completed office visit:10/22/2024 Cha Jose AP[facility]   Next scheduled Follow up:   Future Appointments   Date Time Provider Department Center   4/15/2025  3:30 PM Cha Jose AP[facility]

## 2025-03-05 LAB
AMB EXT EGFR NON-AA: 45
HEMOGLOBIN A1C: 8 % OF TOTAL HGB

## 2025-03-11 ENCOUNTER — TELEPHONE (OUTPATIENT)
Facility: CLINIC | Age: 65
End: 2025-03-11

## 2025-03-11 LAB — AMB EXT GMI: 7.4 %

## 2025-03-11 NOTE — TELEPHONE ENCOUNTER
Encounter created to update GMI on record.  Recent GMI on Dexcom below, for the last 30 days.  External result updated.    Lab Results   Component Value Date    GMI 7.4 03/11/2025

## 2025-03-26 DIAGNOSIS — E11.21 TYPE 2 DIABETES MELLITUS WITH DIABETIC NEPHROPATHY, WITHOUT LONG-TERM CURRENT USE OF INSULIN (HCC): ICD-10-CM

## 2025-03-26 RX ORDER — SEMAGLUTIDE 2.68 MG/ML
2 INJECTION, SOLUTION SUBCUTANEOUS WEEKLY
Qty: 9 ML | Refills: 1 | Status: SHIPPED | OUTPATIENT
Start: 2025-03-26

## 2025-03-26 NOTE — TELEPHONE ENCOUNTER
Endocrine Refill protocol for oral and injectable diabetic medications    Protocol Criteria:  PASSED  Reason: N/A    If all below requirements are met, send a 90-day supply with 1 refill per provider protocol.    Verify appointment with Endocrinology completed in the last 6 months or scheduled in the next 3 months.  Verify A1C has been completed within the last 6 months and is below 8.5%     Last completed office visit: 10/22/2024 Cha Jose APN   Next scheduled Follow up:   Future Appointments   Date Time Provider Department Center   4/15/2025  3:30 PM Cha Jose APN [facility]      Last A1c result: Last A1c value was 8% done 3/4/2025.    Sent to pharmacy per protocol.

## 2025-04-15 ENCOUNTER — OFFICE VISIT (OUTPATIENT)
Facility: CLINIC | Age: 65
End: 2025-04-15
Payer: MEDICARE

## 2025-04-15 VITALS
BODY MASS INDEX: 28.27 KG/M2 | OXYGEN SATURATION: 97 % | HEART RATE: 78 BPM | SYSTOLIC BLOOD PRESSURE: 120 MMHG | WEIGHT: 144 LBS | DIASTOLIC BLOOD PRESSURE: 74 MMHG | HEIGHT: 60 IN

## 2025-04-15 DIAGNOSIS — I15.2 HYPERTENSION ASSOCIATED WITH TYPE 2 DIABETES MELLITUS (HCC): ICD-10-CM

## 2025-04-15 DIAGNOSIS — E11.69 HYPERLIPIDEMIA ASSOCIATED WITH TYPE 2 DIABETES MELLITUS (HCC): ICD-10-CM

## 2025-04-15 DIAGNOSIS — Z94.0 S/P KIDNEY TRANSPLANT (HCC): ICD-10-CM

## 2025-04-15 DIAGNOSIS — E11.59 HYPERTENSION ASSOCIATED WITH TYPE 2 DIABETES MELLITUS (HCC): ICD-10-CM

## 2025-04-15 DIAGNOSIS — N18.32 STAGE 3B CHRONIC KIDNEY DISEASE (HCC): ICD-10-CM

## 2025-04-15 DIAGNOSIS — E11.21 TYPE 2 DIABETES MELLITUS WITH DIABETIC NEPHROPATHY, WITHOUT LONG-TERM CURRENT USE OF INSULIN (HCC): Primary | ICD-10-CM

## 2025-04-15 DIAGNOSIS — E78.5 HYPERLIPIDEMIA ASSOCIATED WITH TYPE 2 DIABETES MELLITUS (HCC): ICD-10-CM

## 2025-04-15 PROCEDURE — 95251 CONT GLUC MNTR ANALYSIS I&R: CPT

## 2025-04-15 PROCEDURE — 99214 OFFICE O/P EST MOD 30 MIN: CPT

## 2025-04-15 RX ORDER — HYDROCHLOROTHIAZIDE 12.5 MG/1
1 CAPSULE ORAL
Qty: 2 EACH | Refills: 3 | Status: SHIPPED | OUTPATIENT
Start: 2025-04-15

## 2025-04-15 RX ORDER — INSULIN DEGLUDEC 100 U/ML
INJECTION, SOLUTION SUBCUTANEOUS
Qty: 15 ML | Refills: 1 | Status: SHIPPED | OUTPATIENT
Start: 2025-04-15

## 2025-04-15 RX ORDER — SEMAGLUTIDE 2.68 MG/ML
2 INJECTION, SOLUTION SUBCUTANEOUS WEEKLY
Qty: 9 ML | Refills: 1 | Status: SHIPPED | OUTPATIENT
Start: 2025-04-15

## 2025-04-15 NOTE — PROGRESS NOTES
-----------------------------  Dexcom Clarity  -----------------------------  Karey Pemberton    YOB: 1960    Generated at: Tue, Apr 15, 2025 8:29 AM CDT    Reporting period: Thu Jan 16, 2025 - Fri Feb 14, 2025  -----------------------------  Glucose Details    Average glucose: 172 mg/dL    GMI: 7.4%    Standard deviation: 42 mg/dL    Coefficient of Variation: 24.4%  -----------------------------  Time in Range    Very High: 5%    High: 34%    In Range: 61%    Low: 0%    Very Low: 0%    Target Range   mg/dL    -----------------------------  Sensor usage    Days with data: 30/30    Time active: 99%    Avg. calibrations per day: 0.3

## 2025-04-15 NOTE — PROGRESS NOTES
[facility]    Name: Karey Pemberton    Date: 4/15/2025    Karey Pemberton is a 65 year old female who presents for evaluation of T2DM management.     Chief complaint: Follow - Up (Pt presents for DM follow up. No new concerns. States has difficulty getting Ozempic from pharmacy, would like it sent to [facility]. )       Subjective:   Initial consult: May 2022- w/ Dr. Audrey Lacey of type 2 diabetes mellitus x 10yrs  Underwent kidney transplant 3 years ago (2019) at [facility], follows with nephrologist ([facility]) and transplant surgeon ([facility]). Has been been on prednisone 5mg, cellcept and myfortic.  Pt feels that her BG has been more labile ever since she drank contrast for colonoscopy in mid-May and all the ED visits for hypoglycemia occurred after that.  Has been having frequent hypoglycemia since mid-May. Often at home has had BG 40-60s. Between 5/20-5/21/22, went to the ED 3x for hypoglycemia, with sx of sweating and itchiness. She took glucose tabs at home and BG was generally better by the time she was seen in the ED.      At initial endocrine visit in May 2022, due to declining GFR (25-32), had changed pt's oral DM meds from (Janumet: 50-1000mg BID, Glipizide 10mg BID) to glipizide 2.5mg BID, Sitagliptin 50mg daily, and metformin 500mg BID.      Interim hx:  4/15/2025-w/ Karina VILLALPANDO. In person visit. Last A1c value was 8% done 3/4/2025.  Noting higher BG- was on a trip to [facility] [facility], eating more high carb foods.   Also had cataract surgery- was less physically active  Switched from Dexcom to Amy 3 due to price. Went onto Medicare in March   Wants to send ozempic to [facility] due to supply problems at local pharmacy.  May be open to mounjaro      DM meds at visit:   Diabetic Medications              semaglutide (OZEMPIC, 2 MG/DOSE,) 8 MG/3ML Subcutaneous Solution Pen-injector (Taking) INJECT 2MG INTO THE SKIN ONCE A WEEK    METFORMIN 500 MG Oral Tab (Taking) TAKE  ONE TABLET BY MOUTH TWICE DAILY WITH MEALS.        insulin degludec (TRESIBA FLEXTOUCH) 100 UNIT/ML Subcutaneous Solution Pen-injector (Taking) INJECT 12 UNITS UNDER THE SKIN DAILY    glucagon (GVOKE HYPOPEN 2-PACK) 1 MG/0.2ML Subcutaneous injection (Taking As Needed) Inject 0.2 mL (1 mg total) into the skin as needed.            Continuous Glucose Monitoring Interpretation  Karey Pemberton has undergone continuous glucose monitoring with their CGM.  The blood glucose tracings were evaluated for two weeks prior to office visit.   Blood glucose tracings demonstrated areas of hyperglycemia post-meal, particularly post-lunch and dinner  There were no areas of hypoglycemia noted.            History/Other:    Allergies, PMH, SocHx and FHx reviewed and updated as appropriate in Epic on    insulin degludec (TRESIBA FLEXTOUCH) 100 UNIT/ML Subcutaneous Solution Pen-injector INJECT 14 UNITS UNDER THE SKIN DAILY 15 mL 1    semaglutide (OZEMPIC, 2 MG/DOSE,) 8 MG/3ML Subcutaneous Solution Pen-injector Inject 2 mg into the skin once a week. 9 mL 1    Continuous Glucose Sensor (FREESTYLE MARIVEL 3 PLUS SENSOR) Does not apply Misc 1 each by Other route every 14 (fourteen) days. 2 each 3    DEXCOM G7 SENSOR Does not apply Misc 1 EACH EVERY 10 DAYS 9 each 1    METFORMIN 500 MG Oral Tab TAKE ONE TABLET BY MOUTH TWICE DAILY WITH MEALS. 180 tablet 1    Glucose Blood (ONETOUCH VERIO) In Vitro Strip Use to test blood sugar 2 times per day 200 strip 3    Insulin Pen Needle (BD PEN NEEDLE SANDRA 2ND GEN) 32G X 4 MM Does not apply Misc Use daily for lantus injection 100 each 3    glucagon (GVOKE HYPOPEN 2-PACK) 1 MG/0.2ML Subcutaneous injection Inject 0.2 mL (1 mg total) into the skin as needed.      predniSONE 2.5 MG Oral Tab Take 1 tablet (2.5 mg total) by mouth daily.      LISINOPRIL 5 MG Oral Tab TAKE 1 TABLET BY MOUTH EVERY DAY 90 tablet 0    carvedilol 25 MG Oral Tab Take 1 tablet (25 mg total) by mouth 2 (two) times daily with meals. 180  tablet 0    amLODIPine 5 MG Oral Tab Take 1 tablet (5 mg total) by mouth daily. (Patient taking differently: Take 1 tablet (5 mg total) by mouth every evening.) 90 tablet 0    atorvastatin 40 MG Oral Tab Take 1 tablet (40 mg total) by mouth nightly. 90 tablet 0    omega-3-acid ethyl esters 1 g Oral Cap Take 2 capsules (2 g total) by mouth 2 (two) times daily. 360 capsule 0    tacrolimus 1 MG Oral Cap Take 1 capsule (1 mg total) by mouth 2 (two) times daily.      mycophenolate mofetil 250 MG Oral Cap Take 1 capsule (250 mg total) by mouth 2 (two) times daily before meals.       Allergies[1]  Current Outpatient Medications   Medication Sig Dispense Refill    insulin degludec (TRESIBA FLEXTOUCH) 100 UNIT/ML Subcutaneous Solution Pen-injector INJECT 14 UNITS UNDER THE SKIN DAILY 15 mL 1    semaglutide (OZEMPIC, 2 MG/DOSE,) 8 MG/3ML Subcutaneous Solution Pen-injector Inject 2 mg into the skin once a week. 9 mL 1    Continuous Glucose Sensor (FREESTYLE MARIVEL 3 PLUS SENSOR) Does not apply Misc 1 each by Other route every 14 (fourteen) days. 2 each 3    DEXCOM G7 SENSOR Does not apply Misc 1 EACH EVERY 10 DAYS 9 each 1    METFORMIN 500 MG Oral Tab TAKE ONE TABLET BY MOUTH TWICE DAILY WITH MEALS. 180 tablet 1    Glucose Blood (ONETOUCH VERIO) In Vitro Strip Use to test blood sugar 2 times per day 200 strip 3    Insulin Pen Needle (BD PEN NEEDLE SANDRA 2ND GEN) 32G X 4 MM Does not apply Misc Use daily for lantus injection 100 each 3    glucagon (GVOKE HYPOPEN 2-PACK) 1 MG/0.2ML Subcutaneous injection Inject 0.2 mL (1 mg total) into the skin as needed.      predniSONE 2.5 MG Oral Tab Take 1 tablet (2.5 mg total) by mouth daily.      LISINOPRIL 5 MG Oral Tab TAKE 1 TABLET BY MOUTH EVERY DAY 90 tablet 0    carvedilol 25 MG Oral Tab Take 1 tablet (25 mg total) by mouth 2 (two) times daily with meals. 180 tablet 0    amLODIPine 5 MG Oral Tab Take 1 tablet (5 mg total) by mouth daily. (Patient taking differently: Take 1 tablet (5 mg  total) by mouth every evening.) 90 tablet 0    atorvastatin 40 MG Oral Tab Take 1 tablet (40 mg total) by mouth nightly. 90 tablet 0    omega-3-acid ethyl esters 1 g Oral Cap Take 2 capsules (2 g total) by mouth 2 (two) times daily. 360 capsule 0    tacrolimus 1 MG Oral Cap Take 1 capsule (1 mg total) by mouth 2 (two) times daily.      mycophenolate mofetil 250 MG Oral Cap Take 1 capsule (250 mg total) by mouth 2 (two) times daily before meals.       Past Medical History:    Allergic rhinitis    Diabetes (HCC)    Diabetes mellitus (HCC)    ESRD (end stage renal disease) on dialysis ([facility])    s/p kidney transplant [facility].  renal: Dr. Osorio    GERD (gastroesophageal reflux disease)    High blood pressure    High cholesterol    Hyperlipidemia    Hypertension    IgA nephropathy    nephrotic range proteinuria    Obesity    Renal disorder    Vitamin D deficiency     Family History   Problem Relation Age of Onset    Hypertension Mother     Diabetes Mother     Cancer Father     Diabetes Brother     Diabetes Other      Social history: Reviewed.      ROS/Exam    REVIEW OF SYSTEMS: Ten point review of systems has been performed and is otherwise negative and/or non-contributory, except as described above.     VITALS  Vitals:    04/15/25 1533   BP: 120/74   BP Location: Left arm   Patient Position: Sitting   Cuff Size: adult   Pulse: 78   SpO2: 97%   Weight: 144 lb (65.3 kg)   Height: 5' (1.524 m)       Wt Readings from Last 6 Encounters:   04/15/25 144 lb (65.3 kg)   10/22/24 139 lb 3.2 oz (63.1 kg)   07/22/24 139 lb (63 kg)   10/02/23 139 lb 12.8 oz (63.4 kg)   01/31/23 144 lb (65.3 kg)   09/04/22 147 lb 4.3 oz (66.8 kg)       PHYSICAL EXAM  CONSTITUTIONAL:  awake, alert, cooperative, no apparent distress, and appears stated age   PSYCH: normal affect  LUNGS: breathing comfortably  CARDIOVASCULAR:  regular rate   FOOT: Bilateral barefoot skin diabetic exam is normal, visualized feet and the appearance is normal.  Bilateral  monofilament/sensation of both feet is normal.  Pulsation pedal pulse exam of both lower legs/feet is normal as well.   Onychomycosis noted on bilateral great toe        Labs/Imaging: Pertinent imaging reviewed.    Overall glucose control:  Lab Results   Component Value Date    A1C 8.0 (H) 03/04/2025    A1C 7.1 (A) 10/22/2024    A1C 7.4 (A) 07/22/2024    A1C 8.8 (A) 03/19/2024    A1C 7.6 (A) 10/02/2023       Supplementary Documentation:   -Surveillance for Diabetes Complications & Risks  Foot exam/neuropathy: Last Foot Exam: 04/15/25    Retinopathy screening: Last Dilated Eye Exam: 05/22/24  Eye Exam shows Diabetic Retinopathy?: No      Assessment & Plan:     ICD-10-CM    1. Type 2 diabetes mellitus with diabetic nephropathy, without long-term current use of insulin (HCC)  E11.21 GLUC MNTR CONT REC FROM NTRSTL TISS FLU PHYS I&R     insulin degludec (TRESIBA FLEXTOUCH) 100 UNIT/ML Subcutaneous Solution Pen-injector     semaglutide (OZEMPIC, 2 MG/DOSE,) 8 MG/3ML Subcutaneous Solution Pen-injector      2. Hypertension associated with type 2 diabetes mellitus ([facility])  E11.59     I15.2       3. Hyperlipidemia associated with type 2 diabetes mellitus ([facility])  E11.69     E78.5             Karey Pemberton is a pleasant 65 year old female here for evaluation of:    #Type 2 diabetes mellitus with diabetic nephropathy, without long-term current use of insulin   Diagnosed w/ DM2 in ~2014. Hx of IgA nephropathy s/p renal transplant in 2019 at [facility]. Started insulin around 6/2022 with Dr. Ventura.  A1c incrementally improving, with today's 8% done 3/4/2025. Goal <7%.     Limited non-insulin options given CKD3. Previously pt had severe and recurrent hypoglycemia on max dose DIAZ, and at increased risk for lactic acidosis with max dose metformin. Relative CI for SGLT2i given hx of cystitis. She was able to come off DIAZ after ramping up GLP1a, but availability/price is an issue. Pt currently on Tresiba, metformin (renal dose) and max  dose ozempic. Will incr tresiba dose today. Did discuss switching ozempic to mounjaro- she will look into it and let me know. Also discussed sending CGM to [employer], she will alert me if she would like to make this change.     Importance of better glucose control in preventing onset/progression of end-organ damage discussed, as well as goals of therapy and clinical significance of A1C.     - med changes:  - incr tresiba 12u --> 14u daily  - continue ozempic 2mg weekly; discussed considering mounjaro  - continue metformin 500mg twice daily  (renal dosing given fluctuating GFR)  - continue leandra 3 --> leandra 3 plus at pharmacy; did tell her to call us if she'd rather go through [facility]. Would need to order a reader (she would want Dexcom if she does switch)        - check BG 2x/day, test strips re-sent   - relative CI for SGLT2i given hx of cystitis  - hypoglycemic with DIAZ, better control on insulin   - Discussed management of low glucose and provided instructions in AVS   - See above header \"Supplementary Documentation\" for surveillance for diabetes complications & risks    #Hx of CKD s/p renal transplant  GFR on recent labs with transplant team 3/2025-45   - taking ACEi  - continue follow up with nephrologist (Dr. Jaimes with [facility])   Lab Results   Component Value Date    EGFRCR 46 (L) 09/04/2022    MICROALBCREA  03/19/2022      Comment:      .  Unable to calculate due to Urine Microalbumin <1.2 mg/dL        #Hypertension  - SBP is to goal <130, continue follow up with PCP, antihyerptensives as below  BP Readings from Last 1 Encounters:   04/15/25 120/74   BP Meds: amLODIPine Tabs - 5 MG; carvedilol Tabs - 25 MG; lisinopril Tabs - 5 MG     #Hyperlipidemia  - LDL is to goal, continue annual monitoring  Component  Ref Range & Units 12/11/24   Triglycerides  mg/dL 167   Total Cholesterol  <200 mg/dL 137   LDL Cholesterol  <130 mg/dL 75   Direct HDL  >40 mg/dL 35 Abnormal      Cholesterol Meds: atorvastatin Tabs - 40 MG;  omega-3-acid ethyl esters Caps - 1 g        Return in about 3 months (around 7/15/2025) for diabetes follow up.    Prev Dr. Ventura patient, referred to physician for next follow up per patient preference. She states she will call the clinic to arrange.     KWASI Butcher   Endocrinology, Diabetes & Metabolism   4/15/2025    Note to patient: The 21 Century Cures Act makes medical notes like these available to patients in the interest of transparency. However, be advised this is a medical document. It is intended as peer to peer communication. It is written in medical language and may contain abbreviations or verbiage that are unfamiliar. It may appear blunt or direct. Medical documents are intended to carry relevant information, facts as evident, and the clinical opinion of the practitioner.         [1]   Allergies  Allergen Reactions    Penicillin G Benzathine OTHER (SEE COMMENTS)     Site reaction with IM injection. Patient states no issues with amoxicillin or other antibiotics. 9/2/22  RPh    Latex ITCHING

## 2025-04-15 NOTE — PATIENT INSTRUCTIONS
Return Visit: with Physician Return in about 3 months (around 7/15/2025) for diabetes follow up.      Physicians (please call to schedule)   - Dr. Mona Houser  - Dr. Pio Flores  - Dr. Sahra Meng     Medications:  - think about changing ozempic to mounjaro - which has two hormones (mounjaro) to help with your diabetes instead of one (ozempic)  - ozempic 2mg weekly ([facility])  - increase tresiba 12u --> 14u daily  - continue the metformin as is    For continuous glucose monitor coverage:  - Medicare will cover Dexcom or Amy through your Medicare Part B plan as long as you are on insulin. We would have to order it to a durable medical equipment company. There are several companies, but for example: [employer] or [employer].  Please contact us (send a mychart or call the office), if you would like your continuous glucose monitor sent through DME company which would use your medicare CGM benefit.    Updated diabetes medication instructions from today:   Diabetic Medications              semaglutide (OZEMPIC, 2 MG/DOSE,) 8 MG/3ML Subcutaneous Solution Pen-injector (Taking) INJECT 2MG INTO THE SKIN ONCE A WEEK    METFORMIN 500 MG Oral Tab (Taking) TAKE ONE TABLET BY MOUTH TWICE DAILY WITH MEALS.    insulin degludec (TRESIBA FLEXTOUCH) 100 UNIT/ML Subcutaneous Solution Pen-injector (Taking) INJECT 12 UNITS UNDER THE SKIN DAILY    glucagon (GVOKE HYPOPEN 2-PACK) 1 MG/0.2ML Subcutaneous injection (Taking As Needed) Inject 0.2 mL (1 mg total) into the skin as needed.            Lab Results   Component Value Date    A1C 8.0 (H) 03/04/2025    A1C 7.1 (A) 10/22/2024    A1C 7.4 (A) 07/22/2024    A1C 8.8 (A) 03/19/2024    A1C 7.6 (A) 10/02/2023        General follow up information:  Please let us know if you require any refills at least 1 week prior to your medication running out. If you do run out of medication, please call our office ASAP to request refills (do not wait until your follow up).   Please call  our office if sugars at home are consistently greater than 250 or less than 70 for medication adjustment (do not wait until your follow up appointment).  Lab results and imaging will typically be reviewed at follow up appointments, or within 3-5 business days of ALL results being in if you do not have an appointment scheduled in the near future. Our office will contact you for any abnormal results requiring more urgent follow up or action.   The on-call pager is for urgent matters only. If you are a type 1 diabetic and run out of insulin after business hours 8AM-4PM, you may call the on-call pager for a refill to a 24 hour pharmacy.  If you have adrenal insufficiency and run out of steroids, you may call the on-call pager for a refill to a 24 hour pharmacy. All other refill requests should be requested during business hours.    Office phone number: [phone]; phones are open Monday-Friday 8:30-4:30.       HOW TO TREAT LOW BLOOD SUGAR (Hypoglycemia)  Low blood sugar= Less than 70, or if you start to have symptoms (below)  Symptoms: Shaking or trembling, fast heart rate, extreme hunger, sweating, confusion/difficulty concentrating, dizziness.    How to treat a low blood sugar if you are able to eat/drink: The Rule of 15/15  If you are using continuous glucose monitor that says you are low, but you do not have any symptoms, verify on fingerstick that your blood sugar is actually low before treating.   Eat 15 grams of carbs (see examples below)  Check your blood sugar after 15 minutes. If it’s still below your target range, have another serving.   Repeat these steps until it’s in your target range. Once it’s in range, if you're nervous about your sugar going low again, have a protein source (ie, a spoonful of peanut butter).   If you have a CGM you want to look for how your arrow has changed. If you arrow is pointed up or sideways after 15 min, give your CGM more time OR check with a finger stick. Try not to eat more  food until at least 15 min after the first BG check - otherwise you risk having a rebound high.  If you are experiencing symptoms and you are unable to check your blood glucose for any reason, treat the hypoglycemia.  If someone has a low blood sugar and is unconscious: Don’t hesitate to call 911. If someone is unconscious and glucagon is not available or someone does not know how to use it, call 911 immediately.     To treat a low, I recommend you carry with you easy, pre-portioned treatment for low blood sugars that are 15G of carbs:   - Children sized squeeze pouch applesauce (high fiber + carbs help prevent too high of a spike)  - Small children's sized juicebox- 15g carb --> 4oz juice box  - Glucose tablets from [employer], you can find them near diabetes supplies --> Note, you will need to eat 3-4 tablets to get to 15g of carbs  - Children sized fruit snack pack- look for one with 15 grams of total carbohydrate  - Choice of how to treat your low is important. Complex carbs, or foods that contain fats along with carbs (like chocolate) can slow the absorption of glucose and should NOT be used to treat an emergency low

## 2025-04-22 ENCOUNTER — TELEPHONE (OUTPATIENT)
Facility: CLINIC | Age: 65
End: 2025-04-22

## 2025-04-22 NOTE — TELEPHONE ENCOUNTER
Received fax from [facility] [employer] requesting patients office visit notes from last 6 months. Printed LOV notes and faxed to Medicare CGM dept at [phone]. Confirmation received.

## 2025-04-22 NOTE — TELEPHONE ENCOUNTER
Received letter from [employer] stating that Tresiba is no longer on the drug list. Will not continue to cover unless an exception is received.

## 2025-04-24 DIAGNOSIS — E11.21 TYPE 2 DIABETES MELLITUS WITH DIABETIC NEPHROPATHY, WITHOUT LONG-TERM CURRENT USE OF INSULIN (HCC): ICD-10-CM

## 2025-04-24 NOTE — TELEPHONE ENCOUNTER
Endocrine Refill protocol for metformin    Protocol Criteria:  FAILED  Reason: Abnormal labs    If all below requirements are met, send a 90-day supply with 1 refill per provider protocol.     Verify appointment with Endocrinology completed in the last 6 months or scheduled in the next 3 months.  Verify A1C has been completed within the last 6 months and is below 8.5%  Verify last GFR is greater than or equal to 40 in the past 12 months    Last completed office visit:4/15/2025 Cha Jose APN   Next scheduled Follow up: No future appointments.    Last GFR result:    Lab Results   Component Value Date    EGFRCR 46 (L) 09/04/2022     Last A1c result: Last A1C result: 8% done 3/4/2025.    Last GFR 45 on 3/5/2025.

## 2025-05-14 ENCOUNTER — TELEPHONE (OUTPATIENT)
Facility: CLINIC | Age: 65
End: 2025-05-14

## 2025-05-14 NOTE — TELEPHONE ENCOUNTER
Received fax from [employer] requesting additional rx info for Ozempic medication. Placed in provider in basket for review and signing.

## 2025-05-21 DIAGNOSIS — E11.21 TYPE 2 DIABETES MELLITUS WITH DIABETIC NEPHROPATHY, WITHOUT LONG-TERM CURRENT USE OF INSULIN (HCC): ICD-10-CM

## 2025-05-21 RX ORDER — KETOROLAC TROMETHAMINE 30 MG/ML
1 INJECTION, SOLUTION INTRAMUSCULAR; INTRAVENOUS AS DIRECTED
Qty: 1 EACH | Refills: 0 | OUTPATIENT
Start: 2025-05-21

## 2025-05-28 ENCOUNTER — MED REC SCAN ONLY (OUTPATIENT)
Facility: CLINIC | Age: 65
End: 2025-05-28

## 2025-07-14 ENCOUNTER — TELEPHONE (OUTPATIENT)
Facility: CLINIC | Age: 65
End: 2025-07-14

## 2025-07-14 NOTE — TELEPHONE ENCOUNTER
Telephoned in regarding tresiba insurance won't cover medication needs to change to Trulicity. Telephoned pharmacy on meal break.

## 2025-07-17 ENCOUNTER — PATIENT MESSAGE (OUTPATIENT)
Facility: CLINIC | Age: 65
End: 2025-07-17

## 2025-07-17 DIAGNOSIS — E11.21 TYPE 2 DIABETES MELLITUS WITH DIABETIC NEPHROPATHY, WITHOUT LONG-TERM CURRENT USE OF INSULIN (HCC): Primary | ICD-10-CM

## 2025-07-18 RX ORDER — INSULIN GLARGINE 100 [IU]/ML
INJECTION, SOLUTION SUBCUTANEOUS
Qty: 15 ML | Refills: 1 | Status: SHIPPED | OUTPATIENT
Start: 2025-07-18

## 2025-07-18 NOTE — TELEPHONE ENCOUNTER
Endocrine refill protocol for basal insulins     Protocol Criteria: PASSED Reason: N/A    If all below requirements are met, send a 90-day supply with 1 refill per provider protocol.       Verify Appointment with Endocrinology completed in the last 6 months or scheduled in the next 3 months.  Verify A1C has been completed within the last 6 months and is below 8.5%     Last completed office visit:4/15/2025 Cha Jose APN   Last completed telemed visit: Visit date not found  Next scheduled Follow up:   Future Appointments   Date Time Provider Department Center   9/8/2025 10:30 AM Cha Jose APN [facility]      Last A1c result: Last A1c value was 8% done 3/4/2025.

## (undated) NOTE — LETTER
10/22/2024    Karey Pemberton  [address] [place] [address]    Subject: Diabetes and Traveling with Insulin    To Whom It May Concern:     Karey Pemberton  is under my care for Diabetes and requires insulin medication for management of her diabetes.   Karey Pemberton may be wearing a device on the skin called Continuous Glucose Monitoring System. It cannot be removed from the patient.   TSA full body scans or security magnetometers can cause insulin pump or glucose monitor malfunction. Patients who are currently on an insulin pump or glucose monitoring system should have a pat-down or metal detector check instead.    Please allow Karey to carry her insulin and needles with her while traveling.    Low blood sugar is also a possibility which would require treatment with glucose tablets, juice boxes or other acceptable substitutes in order to increase the blood sugar back up to a safe level. Several juice boxes must be available on the airplane and are only commercially available in 4 ounce size or larger. Therefore, it is essential that she have in possession at all times: glucose tablets, juice boxes or other acceptable substitutes in the event a low blood sugar occurs. In addition to  the above items, the patient must have at all times the following items for patient safety:  Lancet device, lancets, glucometer  Testing strips  Alcohol swabs  Glucagon emergency kit  Glucose replacement (juice boxes or tabs) in the event of hypoglycemia (low blood sugar)   The above items cannot be stored in the checked luggage; they must be with the patient in carry-on luggage to avoid instability and access.      If you have any questions regarding this patient and her medical care and requirements, please contact my office at [phone].  Sincerely,          KWASI Mitchell

## (undated) NOTE — IP AVS SNAPSHOT
[facility] [place]  [address] [place], [address] ~ [phone]                Discharge Summary   3/21/2017    [address] [place]           Admission Information        Provider Department    3/21/2017 DO Alfredito Dupree 4nw-A         Than Take 3 tablets (1,875 mg total) by mouth 2 (two) times daily with meals.     Silvina Lamb                                 ergocalciferol 81877 units Caps   Commonly known as:  DRISDOL/VITAMIN D2        TAKE 1 CAPSULE ONCE A WEEK    Silvina aLmb Contact information:    [address] [phone]          Follow up with Sree Bennett MD. Schedule an appointment as soon as possible for a visit in 2 weeks.     Specialty:  Internal Medicine    Contact information:    61 50.2 (03/23/17)  32.9 (03/23/17)  11.6 (03/23/17)  4.4 (03/23/17)  0.7 -- (03/23/17)  4.34 (03/23/17)  2.84 (03/23/17)  1.00 (H) (03/23/17)  0.38 (H) (03/23/17)  0.06    (03/22/17)  67.2 (03/22/17)  20.2 (03/22/17)  8.5 (03/22/17)  2.9 (03/22/17)  0.6  (03 Medicaid plans. To get signed up and covered, please call [phone] and ask to get set up for an insurance coverage that is in-network with [facility].         MyChart     Visit [facility]  You can access your MyChart to more actively manage Atorvastatin Calcium 40 MG Oral Tab    Omega-3-acid Ethyl Esters 1 g Oral Cap       Use: Lower cholesterol, protect your heart   Most common side effects: Dizziness, constipation, abnormal liver function   What to report to your healthcare team:  Bhasakr Henderson

## (undated) NOTE — Clinical Note
Pt will likely call back this afternoon re: ozempic 2mg.   Best case scenario is that her [facility] can supply 4 months' at once Otherwise they'd like to try to submit the same prescription to [facility] mail service Finally, last resort if ozempic isn't approved in this short amount of time is to go back to rybels but at a higher dose (14mg) and refill her glipizide

## (undated) NOTE — Clinical Note
Pantera Mcdermott attached my initial endocrine consult note for our shared pt, please let me know if there is anything else needed, thanks!  -Lynette Connolly

## (undated) NOTE — MR AVS SNAPSHOT
[address], [address] [facility] [address] [address] [phone]               Thank you for choosing us for your health care visit with Rolanda Braden MD.  We are glad to serve you and happy to provide you with this glimepiride 1 MG Tabs   Take 1 tablet (1 mg total) by mouth daily with breakfast.   Commonly known as:  AMARYL           Glucose Blood Strp   USE TO TEST BLOOD SUGARS TWICE A DAY   Commonly known as:  ONETOUCH ULTRA BLUE           lisinopril 20 MG Get your heart pumping – brisk walking, biking, swimming Even 10 minute increments are effective and add up over the week   2 ½ hours per week – spread out over time Use a zaira to keep you motivated   Don’t forget strength training with weights and resist

## (undated) NOTE — LETTER
To Whom It May Concern:    Tomie Carrel has been under our care regarding ongoing medical issues. Because of this, she has been required to restrict her physical activities.     She may resume her usual activities, including work, on March 27, 2017 with t